# Patient Record
Sex: MALE | Race: WHITE | Employment: FULL TIME
[De-identification: names, ages, dates, MRNs, and addresses within clinical notes are randomized per-mention and may not be internally consistent; named-entity substitution may affect disease eponyms.]

---

## 2017-02-13 DIAGNOSIS — F33.41 RECURRENT MAJOR DEPRESSIVE DISORDER, IN PARTIAL REMISSION (H): ICD-10-CM

## 2017-02-13 RX ORDER — BUPROPION HYDROCHLORIDE 100 MG/1
100 TABLET, EXTENDED RELEASE ORAL 2 TIMES DAILY
Qty: 180 TABLET | Refills: 2 | Status: SHIPPED | OUTPATIENT
Start: 2017-02-13 | End: 2017-11-12

## 2017-03-28 DIAGNOSIS — F41.8 DEPRESSION WITH ANXIETY: ICD-10-CM

## 2017-06-17 ENCOUNTER — HEALTH MAINTENANCE LETTER (OUTPATIENT)
Age: 62
End: 2017-06-17

## 2017-07-12 ENCOUNTER — OFFICE VISIT (OUTPATIENT)
Dept: FAMILY MEDICINE | Facility: CLINIC | Age: 62
End: 2017-07-12

## 2017-07-12 VITALS
HEART RATE: 67 BPM | SYSTOLIC BLOOD PRESSURE: 132 MMHG | DIASTOLIC BLOOD PRESSURE: 82 MMHG | HEIGHT: 72 IN | OXYGEN SATURATION: 97 % | BODY MASS INDEX: 35.35 KG/M2 | WEIGHT: 261 LBS

## 2017-07-12 DIAGNOSIS — F41.1 GAD (GENERALIZED ANXIETY DISORDER): ICD-10-CM

## 2017-07-12 DIAGNOSIS — R73.02 GLUCOSE INTOLERANCE (IMPAIRED GLUCOSE TOLERANCE): ICD-10-CM

## 2017-07-12 DIAGNOSIS — Z12.11 SPECIAL SCREENING FOR MALIGNANT NEOPLASMS, COLON: ICD-10-CM

## 2017-07-12 DIAGNOSIS — F33.41 RECURRENT MAJOR DEPRESSIVE DISORDER, IN PARTIAL REMISSION (H): ICD-10-CM

## 2017-07-12 DIAGNOSIS — N40.1 BENIGN PROSTATIC HYPERPLASIA WITH URINARY FREQUENCY: ICD-10-CM

## 2017-07-12 DIAGNOSIS — E66.09 NON MORBID OBESITY DUE TO EXCESS CALORIES: ICD-10-CM

## 2017-07-12 DIAGNOSIS — R35.0 BENIGN PROSTATIC HYPERPLASIA WITH URINARY FREQUENCY: ICD-10-CM

## 2017-07-12 DIAGNOSIS — Z00.00 ROUTINE GENERAL MEDICAL EXAMINATION AT A HEALTH CARE FACILITY: Primary | ICD-10-CM

## 2017-07-12 DIAGNOSIS — M54.12 CERVICAL RADICULOPATHY: ICD-10-CM

## 2017-07-12 DIAGNOSIS — M25.552 HIP PAIN, LEFT: ICD-10-CM

## 2017-07-12 DIAGNOSIS — E55.9 VITAMIN D DEFICIENCY: ICD-10-CM

## 2017-07-12 LAB — HEMOCCULT STL QL: NORMAL

## 2017-07-12 PROCEDURE — 99396 PREV VISIT EST AGE 40-64: CPT | Performed by: FAMILY MEDICINE

## 2017-07-12 PROCEDURE — 82274 ASSAY TEST FOR BLOOD FECAL: CPT | Performed by: FAMILY MEDICINE

## 2017-07-12 PROCEDURE — 36415 COLL VENOUS BLD VENIPUNCTURE: CPT | Performed by: FAMILY MEDICINE

## 2017-07-12 PROCEDURE — 99213 OFFICE O/P EST LOW 20 MIN: CPT | Mod: 25 | Performed by: FAMILY MEDICINE

## 2017-07-12 RX ORDER — DOXAZOSIN 4 MG/1
6 TABLET ORAL AT BEDTIME
Qty: 135 TABLET | Refills: 3 | Status: SHIPPED | OUTPATIENT
Start: 2017-07-12 | End: 2018-05-10

## 2017-07-12 RX ORDER — FINASTERIDE 5 MG/1
5 TABLET, FILM COATED ORAL DAILY
Qty: 30 TABLET | Refills: 3 | Status: SHIPPED | OUTPATIENT
Start: 2017-07-12 | End: 2017-11-02

## 2017-07-12 NOTE — LETTER
My Depression Action Plan  Name: Wesley Art   Date of Birth 1955  Date: 7/12/2017    My doctor: Ailyn Gutierrez   My clinic: [facility] [address] [place] [address] [phone]          GREEN    ZONE   Good Control    What it looks like:     Things are going generally well. You have normal up s and down s. You may even feel depressed from time to time, but bad moods usually last less than a day.   What you need to do:  1. Continue to care for yourself (see self care plan)  2. Check your depression survival kit and update it as needed  3. Follow your physician s recommendations including any medication.  4. Do not stop taking medication unless you consult with your physician first.           YELLOW         ZONE Getting Worse    What it looks like:     Depression is starting to interfere with your life.     It may be hard to get out of bed; you may be starting to isolate yourself from others.    Symptoms of depression are starting to last most all day and this has happened for several days.     You may have suicidal thoughts but they are not constant.   What you need to do:     1. Call your care team, your response to treatment will improve if you keep your care team informed of your progress. Yellow periods are signs an adjustment may need to be made.     2. Continue your self-care, even if you have to fake it!    3. Talk to someone in your support network    4. Open up your depression survival kit           RED    ZONE Medical Alert - Get Help    What it looks like:     Depression is seriously interfering with your life.     You may experience these or other symptoms: You can t get out of bed most days, can t work or engage in other necessary activities, you have trouble taking care of basic hygiene, or basic responsibilities, thoughts of suicide or death that will not go away, self-injurious behavior.     What you need to do:  1. Call your care team  and request a same-day appointment. If they are not available (weekends or after hours) call your local crisis line, emergency room or 911.      Electronically signed by: Brittny Zavala, July 12, 2017    Depression Self Care Plan / Survival Kit    Self-Care for Depression  Here s the deal. Your body and mind are really not as separate as most people think.  What you do and think affects how you feel and how you feel influences what you do and think. This means if you do things that people who feel good do, it will help you feel better.  Sometimes this is all it takes.  There is also a place for medication and therapy depending on how severe your depression is, so be sure to consult with your medical provider and/ or Behavioral Health Consultant if your symptoms are worsening or not improving.     In order to better manage my stress, I will:    Exercise  Get some form of exercise, every day. This will help reduce pain and release endorphins, the  feel good  chemicals in your brain. This is almost as good as taking antidepressants!  This is not the same as joining a gym and then never going! (they count on that by the way ) It can be as simple as just going for a walk or doing some gardening, anything that will get you moving.      Hygiene   Maintain good hygiene (Get out of bed in the morning, Make your bed, Brush your teeth, Take a shower, and Get dressed like you were going to work, even if you are unemployed).  If your clothes don't fit try to get ones that do.    Diet  I will strive to eat foods that are good for me, drink plenty of water, and avoid excessive sugar, caffeine, alcohol, and other mood-altering substances.  Some foods that are helpful in depression are: complex carbohydrates, B vitamins, flaxseed, fish or fish oil, fresh fruits and vegetables.    Psychotherapy  I agree to participate in Individual Therapy (if recommended).    Medication  If prescribed medications, I agree to take them.  Missing  doses can result in serious side effects.  I understand that drinking alcohol, or other illicit drug use, may cause potential side effects.  I will not stop my medication abruptly without first discussing it with my provider.    Staying Connected With Others  I will stay in touch with my friends, family members, and my primary care provider/team.    Use your imagination  Be creative.  We all have a creative side; it doesn t matter if it s oil painting, sand castles, or mud pies! This will also kick up the endorphins.    Witness Beauty  (AKA stop and smell the roses) Take a look outside, even in mid-winter. Notice colors, textures. Watch the squirrels and birds.     Service to others  Be of service to others.  There is always someone else in need.  By helping others we can  get out of ourselves  and remember the really important things.  This also provides opportunities for practicing all the other parts of the program.    Humor  Laugh and be silly!  Adjust your TV habits for less news and crime-drama and more comedy.    Control your stress  Try breathing deep, massage therapy, biofeedback, and meditation. Find time to relax each day.     My support system    Clinic Contact:  Phone number:    Contact 1:  Phone number:    Contact 2:  Phone number:    [employer]/:  Phone number:    Therapist:  Phone number:    Local crisis center:    Phone number:    Other community support:  Phone number:

## 2017-07-12 NOTE — MR AVS SNAPSHOT
After Visit Summary   7/12/2017    Wesley Art    MRN: [id number]           Patient Information     Date Of Birth          1955        Visit Information        Provider Department      7/12/2017 8:45 AM Ailyn Gutierrez MD [facility]        Today's Diagnoses     Routine general medical examination at a health care facility    -  1    Glucose intolerance (impaired glucose tolerance)        Recurrent major depressive disorder, in partial remission (H)        LU (generalized anxiety disorder)        Hip pain, left        Cervical radiculopathy        Vitamin D deficiency        Benign prostatic hyperplasia with urinary frequency        Non morbid obesity due to excess calories        Special screening for malignant neoplasms, colon          Care Instructions      Preventive Health Recommendations  Male Ages 50 - 64    Yearly exam:             See your health care provider every year in order to  o   Review health changes.   o   Discuss preventive care.    o   Review your medicines if your doctor has prescribed any.     Have a cholesterol test every 5 years, or more frequently if you are at risk for high cholesterol/heart disease.     Have a diabetes test (fasting glucose) every three years. If you are at risk for diabetes, you should have this test more often.     Have a colonoscopy at age 50, or have a yearly FIT test (stool test). These exams will check for colon cancer.      Talk with your health care provider about whether or not a prostate cancer screening test (PSA) is right for you.    You should be tested each year for STDs (sexually transmitted diseases), if you re at risk.     Shots: Get a flu shot each year. Get a tetanus shot every 10 years.     Nutrition:    Eat at least 5 servings of fruits and vegetables daily.     Eat whole-grain bread, whole-wheat pasta and brown rice instead of white grains and rice.     Talk to your provider about Calcium and Vitamin D.  "    Lifestyle    Exercise for at least 150 minutes a week (30 minutes a day, 5 days a week). This will help you control your weight and prevent disease.     Limit alcohol to one drink per day.     No smoking.     Wear sunscreen to prevent skin cancer.     See your dentist every six months for an exam and cleaning.     See your eye doctor every 1 to 2 years.            Follow-ups after your visit        Who to contact     If you have questions or need follow up information about today's clinic visit or your schedule please contact [facility] directly at [phone].  Normal or non-critical lab and imaging results will be communicated to you by [employer]hart, letter or phone within 4 business days after the clinic has received the results. If you do not hear from us within 7 days, please contact the clinic through [employer] or phone. If you have a critical or abnormal lab result, we will notify you by phone as soon as possible.  Submit refill requests through [employer] or call your pharmacy and they will forward the refill request to us. Please allow 3 business days for your refill to be completed.          Additional Information About Your Visit        [employer] Information     [employer]s lets you send messages to your doctor, view your test results, renew your prescriptions, schedule appointments and more. To sign up, go to [email or web address] . Click on \"Log in\" on the left side of the screen, which will take you to the Welcome page. Then click on \"Sign up Now\" on the right side of the page.     You will be asked to enter the access code listed below, as well as some personal information. Please follow the directions to create your username and password.     Your access code is: [email or web address]  Expires: 10/10/2017 10:17 AM     Your access code will  in 90 days. If you need help or a new code, please call your [place] clinic or [phone].        Care EveryWhere ID     This is your Care EveryWhere ID. This " "could be used by other organizations to access your [facility] medical records  [id number]        Your Vitals Were     Pulse Height Pulse Oximetry BMI (Body Mass Index)          67 1.822 m (5' 11.75\") 97% 35.65 kg/m2         Blood Pressure from Last 3 Encounters:   07/12/17 132/82   11/11/16 (!) 146/97   11/01/16 134/80    Weight from Last 3 Encounters:   07/12/17 118.4 kg (261 lb)   11/01/16 108.9 kg (240 lb)   10/31/16 114.3 kg (252 lb)              We Performed the Following     Comp. Metabolic Panel (14) ([facility])     DEPRESSION ACTION PLAN (DAP)     Hemoglobin A1C ([facility])     Hemosure - IFOB ([facility])     Lipid Panel ([facility])     PSA Serum ([facility])     VENOUS COLLECTION     Vitamin D  25-Hydroxy ([facility])          Today's Medication Changes          These changes are accurate as of: 7/12/17 10:17 AM.  If you have any questions, ask your nurse or doctor.               These medicines have changed or have updated prescriptions.        Dose/Directions    doxazosin 4 MG tablet   Commonly known as:  CARDURA   This may have changed:  See the new instructions.   Used for:  Benign prostatic hyperplasia with urinary frequency   Changed by:  Ailyn Gutierrez MD        Dose:  6 mg   Take 1.5 tablets (6 mg) by mouth At Bedtime   Quantity:  135 tablet   Refills:  3       sertraline 50 MG tablet   Commonly known as:  ZOLOFT   This may have changed:  how much to take   Used for:  Recurrent major depressive disorder, in partial remission (H), LU (generalized anxiety disorder)   Changed by:  Ailyn Gutierrez MD        Dose:  75 mg   Take 1.5 tablets (75 mg) by mouth daily   Quantity:  135 tablet   Refills:  3            Where to get your medicines      These medications were sent to [facility] [address] [facility], [place] - [address] AT [facility] [address]  [address], [facility] [address]     Phone:  [phone]     doxazosin 4 MG tablet    finasteride 5 MG tablet    " sertraline 50 MG tablet                Primary Care Provider Office Phone # Fax #    Ailyn Geraldine Gutierrez [phone]       [facility] [address]  [facility] [address]        Equal Access to Services     MONETCHRISTA ALFONSO : Hadii daysi ku hadaydeo Soomaali, waaxda luqadaha, qaybta kaalmada adeegyada, tye womackn matilde tian larosimalik eric. So [facility] [phone].    ATENCIÓN: Si habla español, tiene a salinas disposición servicios gratuitos de asistencia lingüística. Llame al [phone].    We comply with applicable federal civil rights laws and [place] laws. We do not discriminate on the basis of race, color, national origin, age, disability sex, sexual orientation or gender identity.            Thank you!     Thank you for choosing [facility]  for your care. Our goal is always to provide you with excellent care. Hearing back from our patients is one way we can continue to improve our services. Please take a few minutes to complete the written survey that you may receive in the mail after your visit with us. Thank you!             Your Updated Medication List - Protect others around you: Learn how to safely use, store and throw away your medicines at www.disposemymeds.org.          This list is accurate as of: 7/12/17 10:17 AM.  Always use your most recent med list.                   Brand Name Dispense Instructions for use Diagnosis    B-12 (Methylcobalamin) 1000 MCG Subl     90 tablet    Place 1 tablet under the tongue every other day    H/O gastric bypass, B12 deficiency       buPROPion 100 MG 12 hr tablet    WELLBUTRIN SR    180 tablet    Take 1 tablet (100 mg) by mouth 2 times daily    Recurrent major depressive disorder, in partial remission (H)       doxazosin 4 MG tablet    CARDURA    135 tablet    Take 1.5 tablets (6 mg) by mouth At Bedtime    Benign prostatic hyperplasia with urinary frequency       finasteride 5 MG tablet    PROSCAR    30 tablet    Take 1 tablet  (5 mg) by mouth daily    Benign prostatic hyperplasia with urinary frequency       MULTI COMPLETE PO      Take  by mouth.        omeprazole 20 MG CR capsule    priLOSEC    90 capsule    Take 1 capsule by mouth daily.    Esophageal reflux       sertraline 50 MG tablet    ZOLOFT    135 tablet    Take 1.5 tablets (75 mg) by mouth daily    Recurrent major depressive disorder, in partial remission (H), LU (generalized anxiety disorder)       VITAMIN C PO      Take 1,000 mg by mouth daily        VITAMIN D (CHOLECALCIFEROL) PO      Take 2,000 Units by mouth daily    Needs flu shot

## 2017-07-12 NOTE — PROGRESS NOTES
"  SUBJECTIVE:   CC: Wesley Art is an 62 year old male who presents for preventative health visit.     Healthy Habits:    Do you get at least three servings of calcium containing foods daily (dairy, green leafy vegetables, etc.)? yes and no, taking calcium and/or vitamin D supplement    Amount of exercise or daily activities, outside of work: 1 day(s) per week, walk, mow lawn    Problems taking medications regularly No    Medication side effects: No    Have you had an eye exam in the past two years? yes    Do you see a dentist twice per year? no    Do you have sleep apnea, excessive snoring or daytime drowsiness?no        Depression and Anxiety Follow-Up    Status since last visit: Improved since last dose adjustment of medications including adding sertraline and lowering Wellbutrin    Other associated symptoms:stress overeating, but has stayed away from alcohol which previously was his crutch. He has been sober since 11/11/07.    Complicating factors:     Significant life event: Yes-  Death of mother, suicide death of friend     Current substance abuse: None    BPH  Better since being on Cardura, but not perfect.     Left Hip Injury  Four days ago was playing frisbee and he noticed a \"pop\" in his left hip as he stretched on a frisbee throw follow through. He was able to ambulate with pain. He iced the area. It has gradually improved. It is now quite bruised.    Cervical Radiculopathy  He continues to have pain in the right elbow and hand. He has started going to an acupuncturist and using an ergonamic mouse which has helped. There is a spot on his right hand that when touched sends electric shock type symptom into the the index finger.         PHQ-9 SCORE 6/12/2015 7/7/2016 8/18/2016   Total Score 4 - -   Total Score - 7 9     No flowsheet data found.    PHQ-9  English  PHQ-9   Any Language  GAD7      HEARING FREQUENCY:   Right Ear: passed  Left Ear: passed    Today's PHQ-2 Score:   PHQ-2 ( 1999 Pf[employer]) " 7/12/2017 7/7/2016   Q1: Little interest or pleasure in doing things 1 2   Q2: Feeling down, depressed or hopeless 1 2   PHQ-2 Score 2 4       Abuse: Current or Past(Physical, Sexual or Emotional)- No  Do you feel safe in your environment - Yes    Social History   Substance Use Topics     Smoking status: Never Smoker     Smokeless tobacco: Never Used     Alcohol use No      Comment: sober since 11/11/2007     none    Last PSA: No results found for: PSA    Reviewed orders with patient. Reviewed health maintenance and updated orders accordingly - Yes  Labs reviewed in EPIC  BP Readings from Last 3 Encounters:   07/12/17 132/82   11/11/16 (!) 146/97   11/01/16 134/80    Wt Readings from Last 3 Encounters:   07/12/17 118.4 kg (261 lb)   11/01/16 108.9 kg (240 lb)   10/31/16 114.3 kg (252 lb)                  Patient Active Problem List   Diagnosis     Major depression in partial remission (H)     Primary osteoarthritis involving multiple joints     Benign nodular prostatic hyperplasia with lower urinary tract symptoms     Glucose intolerance (impaired glucose tolerance)     LU (generalized anxiety disorder)     Cervical radiculopathy     Past Surgical History:   Procedure Laterality Date     APPENDECTOMY  child     CHOLECYSTECTOMY, LAPOROSCOPIC  1998    Cholecystectomy, Laparoscopic     LAPAROSCOPIC REVISION GASTROPLASTY TO MACIEJ-EN-Y  2006     VASECTOMY  1980       Social History   Substance Use Topics     Smoking status: Never Smoker     Smokeless tobacco: Never Used     Alcohol use No      Comment: sober since 11/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 Father          Current Outpatient Prescriptions   Medication Sig Dispense Refill     sertraline (ZOLOFT) 50 MG tablet Take 1.5 tablets (75 mg) by mouth daily 135 tablet 3     buPROPion (WELLBUTRIN SR) 100 MG 12 hr tablet Take 1 tablet (100 mg) by mouth 2 times daily 180 tablet 2     VITAMIN D, CHOLECALCIFEROL, PO Take 2,000 Units by mouth  daily       doxazosin (CARDURA) 4 MG tablet TAKE 1 TABLET BY MOUTH EVERY NIGHT AT BEDTIME 90 tablet 3     B-12, Methylcobalamin, 1000 MCG SUBL Place 1 tablet under the tongue every other day 90 tablet 1     finasteride (PROSCAR) 5 MG tablet Take 5 mg by mouth daily  3     Multiple Vitamins-Minerals (MULTI COMPLETE PO) Take  by mouth.       Ascorbic Acid (VITAMIN C PO) Take 1,000 mg by mouth daily        omeprazole (PRILOSEC) 20 MG capsule Take 1 capsule by mouth daily. 90 capsule 3     [DISCONTINUED] sertraline (ZOLOFT) 50 MG tablet Take 1 tablet (50 mg) by mouth daily 30 tablet 3     Allergies   Allergen Reactions     Iodine      Recent Labs   Lab Test  08/01/16   0953  07/07/16   0851  06/12/15   0856  04/07/14   0928  03/04/14   1109  03/04/14   0900   A1C   --   5.6  5.7*   --   5.5   --    LDL   --    --   93   --    --   97   HDL   --    --   75   --    --   85   TRIG   --    --   86   --    --   108   ALT   --    --   24   --    --   23   CR  1.00   --   1.01   --    --   1.05   POTASSIUM   --    --   4.3  4.1   --   3.8        Reviewed and updated as needed this visit by clinical staff  Tobacco  Allergies  Meds         Reviewed and updated as needed this visit by Provider        Past Medical History:   Diagnosis Date     Alcohol abuse, unspecified     PMH alcohol abuse     Depressive disorder, not elsewhere classified      Essential hypertension, benign     resolved     Hematuria      Obesity, unspecified      Osteoarthritis 6/12/2015     Type II or unspecified type diabetes mellitus without mention of complication, not stated as uncontrolled     resolved      Past Surgical History:   Procedure Laterality Date     APPENDECTOMY  child     CHOLECYSTECTOMY, LAPOROSCOPIC  1998    Cholecystectomy, Laparoscopic     LAPAROSCOPIC REVISION GASTROPLASTY TO MACIEJ-EN-Y  2006     VASECTOMY  1980       ROS:  C: NEGATIVE for fever, chills, change in weight  I: NEGATIVE for worrisome rashes, moles or lesions  E: NEGATIVE  "for vision changes or irritation  ENT: NEGATIVE for ear, mouth and throat problems  R: NEGATIVE for significant cough or SOB  CV: NEGATIVE for chest pain, palpitations or peripheral edema  GI: NEGATIVE for nausea, abdominal pain, heartburn, or change in bowel habits   male: negative for dysuria, hematuria, and see above  MUSCULOSKELETAL:as above  N: NEGATIVE for weakness, dizziness or paresthesias  P: NEGATIVE for changes in mood or affect    OBJECTIVE:   /82  Pulse 67  Ht 1.822 m (5' 11.75\")  Wt 118.4 kg (261 lb)  SpO2 97%  BMI 35.65 kg/m2  EXAM:  GENERAL: healthy, alert and no distress  EYES: Eyes grossly normal to inspection, PERRL and conjunctivae and sclerae normal  HENT: ear canals and TM's normal, nose and mouth without ulcers or lesions  NECK: no adenopathy, no asymmetry, masses, or scars and thyroid normal to palpation  RESP: lungs clear to auscultation - no rales, rhonchi or wheezes  CV: regular rate and rhythm, normal S1 S2, no S3 or S4, no murmur, click or rub, no peripheral edema and peripheral pulses strong  ABDOMEN: soft, nontender, no hepatosplenomegaly, no masses and bowel sounds normal   (male): normal male genitalia without lesions or urethral discharge, no hernia  RECTAL: normal sphincter tone, no rectal masses, prostate normal size, smooth, nontender without nodules or masses  MS: left hip with lateral tenderness with internal rotation, fine with external rotation, no pain with abduction, adduction, No pain to palpation of the iliac crest, bruising is noted in the area between the iliac crest and the trochanter.  SKIN: no suspicious lesions or rashes  NEURO: Normal strength and tone, mentation intact and speech normal  PSYCH: mentation appears normal, affect normal/bright    ASSESSMENT/PLAN:   Wesley was seen today for physical and hearing screening.    Diagnoses and all orders for this visit:    Routine general medical examination at a health care facility  -     Lipid Panel " "([facility])  -     VENOUS COLLECTION    Glucose intolerance (impaired glucose tolerance)  -     Hemoglobin A1C ([facility])  -     VENOUS COLLECTION    Recurrent major depressive disorder, in partial remission (H)  -     sertraline (ZOLOFT) 50 MG tablet; Take 1.5 tablets (75 mg) by mouth daily  Continue Wellbutrin.    LU (generalized anxiety disorder)  -     sertraline (ZOLOFT) 50 MG tablet; Take 1.5 tablets (75 mg) by mouth daily  Trial of increasing dose slightly.     Hip pain, left  Continue with ice, ibuprofen.    Cervical radiculopathy  Refer to OT/PT for evaluation and treatment.    Vitamin D deficiency  -     Vitamin D  25-Hydroxy ([facility])    Benign prostatic hyperplasia with urinary frequency  -     PSA Serum ([facility])  Continue finesteride 5 mg daily, trial of increasing doxazosyn to 6 mg at hs.    Non morbid obesity due to excess calories  -     Comp. Metabolic Panel (14) ([facility])  -     VENOUS COLLECTION  Strongly recommend adding regular exercise.    Special screening for malignant neoplasms, colon  -     Hemosure - IFOB ([facility])  He is also given a hemosure for collection at home.     Other orders  -     DEPRESSION ACTION PLAN (DAP)        COUNSELING:  Reviewed preventive health counseling, as reflected in patient instructions       Regular exercise       Healthy diet/nutrition       Vision screening       Hearing screening       Colon cancer screening       Prostate cancer screening         reports that he has never smoked. He has never used smokeless tobacco.    Estimated body mass index is 35.65 kg/(m^2) as calculated from the following:    Height as of this encounter: 1.822 m (5' 11.75\").    Weight as of this encounter: 118.4 kg (261 lb).   Weight management plan: Discussed healthy diet and exercise guidelines and patient will follow up in 12 months in clinic to re-evaluate.    Counseling Resources:  ATP IV Guidelines  Pooled Cohorts Equation Calculator  FRAX Risk Assessment  ICSI Preventive " Guidelines  Dietary Guidelines for Americans, 2010  USDA's MyPlate  ASA Prophylaxis  Lung CA Screening    Ailyn Gutierrez MD  [facility]

## 2017-07-13 LAB
ALBUMIN SERPL-MCNC: 4.1 G/DL (ref 3.6–4.8)
ALBUMIN/GLOB SERPL: 1.4 {RATIO} (ref 1.2–2.2)
ALP SERPL-CCNC: 73 IU/L (ref 39–117)
ALT SERPL-CCNC: 23 IU/L (ref 0–44)
AST SERPL-CCNC: 27 IU/L (ref 0–40)
BILIRUB SERPL-MCNC: 0.5 MG/DL (ref 0–1.2)
BUN SERPL-MCNC: 15 MG/DL (ref 8–27)
BUN/CREATININE RATIO: 16 (ref 10–24)
CALCIUM SERPL-MCNC: 9.5 MG/DL (ref 8.6–10.2)
CHLORIDE SERPLBLD-SCNC: 104 MMOL/L (ref 96–106)
CHOLEST SERPL-MCNC: 206 MG/DL (ref 100–199)
CREAT SERPL-MCNC: 0.92 MG/DL (ref 0.76–1.27)
EGFR IF AFRICN AM: 101 ML/MIN/1.73
EGFR IF NONAFRICN AM: 88 ML/MIN/1.73
GLOBULIN, TOTAL: 2.9 G/DL (ref 1.5–4.5)
GLUCOSE SERPL-MCNC: 100 MG/DL (ref 65–99)
HBA1C MFR BLD: 5.6 % (ref 4.8–5.6)
HDLC SERPL-MCNC: 65 MG/DL
LDL/HDL RATIO: 1.8 RATIO UNITS (ref 0–3.6)
LDLC SERPL CALC-MCNC: 116 MG/DL (ref 0–99)
POTASSIUM SERPL-SCNC: 4.6 MMOL/L (ref 3.5–5.2)
PROT SERPL-MCNC: 7 G/DL (ref 6–8.5)
PSA NG/ML: 0.5 NG/ML (ref 0–4)
SODIUM SERPL-SCNC: 143 MMOL/L (ref 134–144)
TOTAL CO2: 23 MMOL/L (ref 18–28)
TRIGL SERPL-MCNC: 126 MG/DL (ref 0–149)
VITAMIN D, 25-HYDROXY: 26 NG/ML (ref 30–100)
VLDLC SERPL CALC-MCNC: 25 MG/DL (ref 5–40)

## 2017-07-13 ASSESSMENT — PATIENT HEALTH QUESTIONNAIRE - PHQ9: SUM OF ALL RESPONSES TO PHQ QUESTIONS 1-9: 6

## 2017-07-18 ENCOUNTER — TELEPHONE (OUTPATIENT)
Dept: FAMILY MEDICINE | Facility: CLINIC | Age: 62
End: 2017-07-18

## 2017-07-18 DIAGNOSIS — R79.89 LOW VITAMIN D LEVEL: Primary | ICD-10-CM

## 2017-07-18 RX ORDER — CHOLECALCIFEROL (VITAMIN D3) 125 MCG
5000 CAPSULE ORAL DAILY
Qty: 100 TABLET | Refills: 3 | COMMUNITY
Start: 2017-07-18

## 2017-07-18 NOTE — TELEPHONE ENCOUNTER
Called patient with lab results.  Informed him of all labs looking OK except for low Vit D level.  Patient does say he takes 2000 iu every day.  Advised him to increase this by 2000 iu so he will take 4000 iu daily and take with a meal.  Mailed out patient copy of labs.

## 2017-07-27 ENCOUNTER — THERAPY VISIT (OUTPATIENT)
Dept: OCCUPATIONAL THERAPY | Facility: CLINIC | Age: 62
End: 2017-07-27
Payer: COMMERCIAL

## 2017-07-27 DIAGNOSIS — M54.12 CERVICAL RADICULOPATHY: ICD-10-CM

## 2017-07-27 DIAGNOSIS — M25.529 ELBOW PAIN: Primary | ICD-10-CM

## 2017-07-27 PROCEDURE — 97165 OT EVAL LOW COMPLEX 30 MIN: CPT | Mod: GO | Performed by: OCCUPATIONAL THERAPIST

## 2017-07-27 PROCEDURE — 97110 THERAPEUTIC EXERCISES: CPT | Mod: GO | Performed by: OCCUPATIONAL THERAPIST

## 2017-07-27 PROCEDURE — 97140 MANUAL THERAPY 1/> REGIONS: CPT | Mod: GO | Performed by: OCCUPATIONAL THERAPIST

## 2017-07-27 NOTE — PROGRESS NOTES
Hand Therapy Initial Evaluation    Current Date:  7/27/2017    Diagnosis:  Cervical radiculopathy and elbow pain  DOI:  3 months    Subjective:  Wesley Art is a 62 year old right hand dominant male.    Patient reports symptoms of pain, stiffness/loss of motion and weakness/loss of strength of the right hand which occurred due to overuse. Since onset symptoms are Gradually getting worse.  Special tests:  none.  Previous treatment: PT one year ago.    General health as reported by patient is good.  Pertinent medical history includes:diabetes, overweight, high blood pressure, depression , migraines/headaches, numbness/tingling, dizziness/concussions  Medical allergies:none.  Surgical history: other: gastric bypass.  Medication history: anti-depressants.    Occupational Profile Information:  Current occupation is   Currently working in normal job without restrictions  Job Tasks: prolonged sitting, repetitive tasks  Prior functional level:  no limitations  Barriers include:none  Mobility: No difficulty  Transportation: drives  Leisure activities/hobbies: frisbee, yard work, golfing, cooking  Other: na    Upper Extremity Functional Index Score:  SCORE:   Column Totals: /80: 53   (A lower score indicates greater disability.)    Objective:    Pain Level Report  VAS(0-10) 7/27/2017   At Rest: Elbow: 2-3 with pressure  Hand: 2 stiff   With Use: Elbow: 6-7  Hand 7-8     Report of Pain:  Location:  elbow and hand  Pain Quality:  Aching and Dull  Frequency: constant    Pain is worst:  daytime  Exacerbated by:  use  Relieved by:  cold  Progression:  worsening  ROM:  Pain Report:  - none    + mild    ++ moderate    +++ severe   Elbow  7/27/2017   AROM(PROM) Left Right   Extension  Full   Flexion  Full   Supination  Stretch pain   Pronation  Full     ROM  Wrist 7/27/17   AROM (PROM) Right   Extension Full   Flexion Full   RD Full   UD Full     Palaption: pain scale of 0-10/10  Date 7/27/2017   Side Right   MEP  "+   Flexor Wad Origin +   Cubital Tunnel (just proximal) +   PT +   Wrist  + radial side     Other Provocative Testing 7/27/2017       Resisted APL/EPB -       Finkelsteins  \"pull\"                                   Palpation Ulnar Nerve Path: Pain level report on scale 0-10/10  Date 7/27/2017   Side Right   [place] -   Flexor Wad Origin +   Cubital Tunnel -   [place] -   Subscapularis +     Range of Motion Ring Finger AROM (PROM):  Date 7/27/2017   Side Right   MP ext Full   PIP ext Full   DIP ext Full     Range of Motion Small Finger AROM (PROM):  Date 7/27/2017   Side Right   MP ext Full   PIP ext Full   DIP ext Full     Special Tests:  Pain Report:  - none    + mild    ++ moderate    +++ severe   Date 7/27/2017   Side Right   Elbow Flexion Test -   Tinels at [place] -   Tinels at Cubital Tunnel -   Ulnar Nerve ULTT 50 %   Paresthesias At HS   Wartenburg's Sign -     MMT: MMT scale of 0-5/5, pain scale of 0-10/10  Date 7/27/2017   Side Right   Elbow flex in neutral 5/5   Elbow flex in pronation 5/5   Wrist flexion 5/5   Pronation 5/5     Ulnar Nerve MMT: (Scale 0/5)  Date 7/27/2017   Side Right   FCU 5/5-   FDP 3 & 4 5/5   ADM 5/5   ODM 5/5   FDM 5/5   Palmar Interossei 4-/5   Dorsal Interossei 4-/5   Lumbricals 3&4 5/5   Adductor Pollicis 5/5   FPB 5/5     Strength:   (Measured in pounds)  Pain Report:  - none    + mild    ++ moderate    +++ severe     7/27/2017   Trials Left Right   1  2  3     Average: 70# 78#     Lat Pinch  7/27/2017   Trials Left Right   1  2  3     Average: 20# 20#     3 Pt Pinch  7/27/2017   Trials Left Right   1  2  3     Average: 19# 19#     Assessment:  Patient presents with symptoms consistent with diagnosis of right Medial Epicondylitis, pain in index DIP joint, tightness in UE (especially pecs) with conservative intervention. Slight Dequervains symptoms.    Patient's limitations or Problem List includes:  Pain, Weakness, Decreased , Decreased " coordination, Decreased dexterity and Tightness in musculature of the right elbow which interferes with the patient's ability to perform Self Care Tasks (dressing, eating, bathing, hygiene/toileting), Recreational Activities and Household Chores as compared to previous level of function.    Rehab Potential:  Excellent - Return to full activity, no limitations    Patient will benefit from skilled Occupational Therapy to increase flexibility, overall strength,  strength, forearm strength, coordination and dexterity and decrease pain to return to previous activity level and resume normal daily tasks and to reach their rehab potential.    Barriers to Learning:  No barrier    Communication Issues:  Patient appears to be able to clearly communicate and understand verbal and written communication and follow directions correctly.    Chart Review: Simple history review with patient    Identified Performance Deficits: bathing/showering, dressing, feeding, hygiene and grooming, driving and community mobility, health management and maintenance, home establishment and management, meal preparation and cleanup, shopping, work and leisure activities    Assessment of Occupational Performance:  5 or more Performance Deficits    Clinical Decision Making (Complexity): Low complexity    Treatment Explanation:  The following has been discussed with the patient:  RX ordered/plan of care  Anticipated outcomes  Possible risks and side effects    Plan:  Frequency:  1 X week, once daily  Duration:  for 8-12 visits    Treatment Plan:    Modalities:  US  Therapeutic Exercise:  AROM, PROM, Place and Hold, Contract Relax, Isotonics, Isometrics and Stabilization  Neuromuscular re-education:  Nerve Gliding, Coordination/Dexterity and Kinesthetic Training  Manual Techniques:  Joint mobilization and Myofascial release  Orthotic Fabrication:  Static orthosis and Forearm based orthosis  Discharge Plan:  Achieve all LTG.  Independent in home  treatment program.  Reach maximal therapeutic benefit.    Home Exercise Program:  Flexor and extensor wad stretches  MFR to flexors  Pec stretch on wall while trigger pointing tight pec major with tennis ball  Pec minor trigger massage over a towel on table with a tennis ball  Golf ball to flexors and volar hand  FA flexor stretches  FM MEP prn  Icing  Scapular squeezes    Next Visit:  Infared laser  MFR  Nerve gliding  Review back strengthening

## 2017-07-27 NOTE — MR AVS SNAPSHOT
"              After Visit Summary   7/27/2017    Wesley Art    MRN: [id number]           Patient Information     Date Of Birth          1955        Visit Information        Provider Department      7/27/2017 8:00 AM Devika Pereira [facility]        Today's Diagnoses     Elbow pain    -  1    Cervical radiculopathy           Follow-ups after your visit        Your next 10 appointments already scheduled     Aug 03, 2017  7:30 AM CDT   VENESSA Hand with Devika Montanez   [facility]    [address] [id number] [phone]            Aug 15, 2017 12:30 PM CDT   VENESSA Hand with Devika Pereira   [facility]    [address] [id number] [phone]              Who to contact     If you have questions or need follow up information about today's clinic visit or your schedule please contact [facility] directly at [phone].  Normal or non-critical lab and imaging results will be communicated to you by [employer], letter or phone within 4 business days after the clinic has received the results. If you do not hear from us within 7 days, please contact the clinic through [employer] or phone. If you have a critical or abnormal lab result, we will notify you by phone as soon as possible.  Submit refill requests through [employer] or call your pharmacy and they will forward the refill request to us. Please allow 3 business days for your refill to be completed.          Additional Information About Your Visit        [employer] Information     [employer] lets you send messages to your doctor, view your test results, renew your prescriptions, schedule appointments and more. To sign up, go to [email or web address] . Click on \"Log in\" on the left side of the screen, which will take you to the Welcome page. Then click on \"Sign up Now\" on the right side of the page.     You will be asked to enter the " access code listed below, as well as some personal information. Please follow the directions to create your username and password.     Your access code is: [email or web address]  Expires: 10/10/2017 10:17 AM     Your access code will  in 90 days. If you need help or a new code, please call your [place] clinic or [phone].        Care EveryWhere ID     This is your Care EveryWhere ID. This could be used by other organizations to access your [place] medical records  [id number]         Blood Pressure from Last 3 Encounters:   17 132/82   16 (!) 146/97   16 134/80    Weight from Last 3 Encounters:   17 118.4 kg (261 lb)   16 108.9 kg (240 lb)   10/31/16 114.3 kg (252 lb)              We Performed the Following     HC OT EVAL, LOW COMPLEXITY     VENESSA INITIAL EVAL REPORT     MANUAL THER TECH,1+REGIONS,EA 15 MIN     THERAPEUTIC EXERCISES        Primary Care Provider Office Phone # Fax #    Ailyn Geraldine Gutierrez [phone]       [facility] [address] [place] [address]        Equal Access to Services     JORGE HAMILTON : Hadii aad ku hadasho Soomaali, waaxda luqadaha, qaybta kaalmada adeegyada, tye adame haymattn matilde reyes. So [facility] [phone].    ATENCIÓN: Si habla español, tiene a salinas disposición servicios gratuitos de asistencia lingüística. Llame al [phone].    We comply with applicable federal civil rights laws and [place] laws. We do not discriminate on the basis of race, color, national origin, age, disability sex, sexual orientation or gender identity.            Thank you!     Thank you for choosing [facility]  for your care. Our goal is always to provide you with excellent care. Hearing back from our patients is one way we can continue to improve our services. Please take a few minutes to complete the written survey that you may receive in the mail after your visit with us. Thank you!             Your Updated  Medication List - Protect others around you: Learn how to safely use, store and throw away your medicines at www.disposemymeds.org.          This list is accurate as of: 7/27/17 11:57 AM.  Always use your most recent med list.                   Brand Name Dispense Instructions for use Diagnosis    B-12 (Methylcobalamin) 1000 MCG Subl     90 tablet    Place 1 tablet under the tongue every other day    H/O gastric bypass, B12 deficiency       buPROPion 100 MG 12 hr tablet    WELLBUTRIN SR    180 tablet    Take 1 tablet (100 mg) by mouth 2 times daily    Recurrent major depressive disorder, in partial remission (H)       doxazosin 4 MG tablet    CARDURA    135 tablet    Take 1.5 tablets (6 mg) by mouth At Bedtime    Benign prostatic hyperplasia with urinary frequency       finasteride 5 MG tablet    PROSCAR    30 tablet    Take 1 tablet (5 mg) by mouth daily    Benign prostatic hyperplasia with urinary frequency       MULTI COMPLETE PO      Take  by mouth.        omeprazole 20 MG CR capsule    priLOSEC    90 capsule    Take 1 capsule by mouth daily.    Esophageal reflux       sertraline 50 MG tablet    ZOLOFT    135 tablet    Take 1.5 tablets (75 mg) by mouth daily    Recurrent major depressive disorder, in partial remission (H), LU (generalized anxiety disorder)       VITAMIN C PO      Take 1,000 mg by mouth daily        vitamin D 2000 UNITS tablet     100 tablet    Take 2,000 Units by mouth 2 times daily    Low vitamin D level

## 2017-08-03 ENCOUNTER — THERAPY VISIT (OUTPATIENT)
Dept: OCCUPATIONAL THERAPY | Facility: CLINIC | Age: 62
End: 2017-08-03
Payer: COMMERCIAL

## 2017-08-03 DIAGNOSIS — M54.12 CERVICAL RADICULOPATHY: ICD-10-CM

## 2017-08-03 DIAGNOSIS — M25.529 ELBOW PAIN: ICD-10-CM

## 2017-08-03 PROCEDURE — 97110 THERAPEUTIC EXERCISES: CPT | Mod: GO | Performed by: OCCUPATIONAL THERAPIST

## 2017-08-03 PROCEDURE — 97026 INFRARED THERAPY: CPT | Mod: GO | Performed by: OCCUPATIONAL THERAPIST

## 2017-08-03 PROCEDURE — 97140 MANUAL THERAPY 1/> REGIONS: CPT | Mod: GO | Performed by: OCCUPATIONAL THERAPIST

## 2017-08-03 NOTE — PROGRESS NOTES
"SOAP note objective information for 8/3/2017.    Please refer to the daily flowsheet for treatment today, total treatment time and time spent performing 1:1 timed codes.       Pain Level Report  VAS(0-10) 7/27/2017 8/3/2017   At Rest: Elbow: 2-3 with pressure  Hand: 2 stiff Elbow: 1 with pressure  Hand: 2 stiff   With Use: Elbow: 6-7  Hand 7-8 Elbow: 4  Hand 6     Report of Pain:  Location:  elbow and hand  Pain Quality:  Aching and Dull  Frequency: constant    Pain is worst:  daytime  Exacerbated by:  use  Relieved by:  cold  Progression:  improving  ROM:  Pain Report:  - none    + mild    ++ moderate    +++ severe   Elbow  7/27/2017   AROM(PROM) Left Right   Extension  Full   Flexion  Full   Supination  Stretch pain   Pronation  Full     ROM  Wrist 7/27/17   AROM (PROM) Right   Extension Full   Flexion Full   RD Full   UD Full     Palaption: pain scale of 0-10/10  Date 7/27/2017 8/3/17   Side Right Right   MEP +    Flexor Wad Origin +    Cubital Tunnel (just proximal) +    PT +    Wrist  + radial side      Other Provocative Testing 7/27/2017       Resisted APL/EPB -       Finkelsteins  \"pull\"                                   Palpation Ulnar Nerve Path: Pain level report on scale 0-10/10  Date 7/27/2017   Side Right   [place] -   Flexor Wad Origin +   Cubital Tunnel -   [place] -   Subscapularis +     Range of Motion Ring Finger AROM (PROM):  Date 7/27/2017   Side Right   MP ext Full   PIP ext Full   DIP ext Full     Range of Motion Small Finger AROM (PROM):  Date 7/27/2017   Side Right   MP ext Full   PIP ext Full   DIP ext Full     Special Tests:  Pain Report:  - none    + mild    ++ moderate    +++ severe   Date 7/27/2017   Side Right   Elbow Flexion Test -   Tinels at [place] -   Tinels at Cubital Tunnel -   Ulnar Nerve ULTT 50 %   Paresthesias At HS   [place]'s Sign -     MMT: MMT scale of 0-5/5, pain scale of 0-10/10  Date 7/27/2017   Side Right   Elbow flex in neutral 5/5   Elbow " flex in pronation 5/5   Wrist flexion 5/5   Pronation 5/5     Ulnar Nerve MMT: (Scale 0/5)  Date 7/27/2017   Side Right   FCU 5/5-   FDP 3 & 4 5/5   ADM 5/5   ODM 5/5   FDM 5/5   Palmar Interossei 4-/5   Dorsal Interossei 4-/5   Lumbricals 3&4 5/5   Adductor Pollicis 5/5   FPB 5/5     Strength:   (Measured in pounds)  Pain Report:  - none    + mild    ++ moderate    +++ severe     7/27/2017   Trials Left Right   1  2  3     Average: 70# 78#     Lat Pinch  7/27/2017   Trials Left Right   1  2  3     Average: 20# 20#     3 Pt Pinch  7/27/2017   Trials Left Right   1  2  3     Average: 19# 19#       Home Exercise Program:  Flexor and extensor wad stretches  MFR to flexors with pro/sup and wrist flex/ext  Pec stretch on wall while trigger pointing tight pec major with tennis ball  Pec minor trigger massage over a towel on table with a tennis ball  Golf ball to flexors and volar hand  FA flexor stretches  FM MEP prn  Icing  Scapular squeezes    Next Visit:  Infared laser  MFR  Nerve gliding  Review back strengthening

## 2017-08-03 NOTE — MR AVS SNAPSHOT
"              After Visit Summary   8/3/2017    Wesley Art    MRN: [id number]           Patient Information     Date Of Birth          1955        Visit Information        Provider Department      8/3/2017 7:30 AM Devika Pereira [facility]        Today's Diagnoses     Elbow pain        Cervical radiculopathy           Follow-ups after your visit        Your next 10 appointments already scheduled     Aug 15, 2017 12:30 PM CDT   VENESSA Hand with Devika Kelli   [facility] ([facility])    [address] [place] [id number] [phone]              Who to contact     If you have questions or need follow up information about today's clinic visit or your schedule please contact [facility] directly at [phone].  Normal or non-critical lab and imaging results will be communicated to you by MyChart, letter or phone within 4 business days after the clinic has received the results. If you do not hear from us within 7 days, please contact the clinic through MyChart or phone. If you have a critical or abnormal lab result, we will notify you by phone as soon as possible.  Submit refill requests through [employer] or call your pharmacy and they will forward the refill request to us. Please allow 3 business days for your refill to be completed.          Additional Information About Your Visit        [employer] Information     [employer] lets you send messages to your doctor, view your test results, renew your prescriptions, schedule appointments and more. To sign up, go to [email or web address] . Click on \"Log in\" on the left side of the screen, which will take you to the Welcome page. Then click on \"Sign up Now\" on the right side of the page.     You will be asked to enter the access code listed below, as well as some personal information. Please follow the directions to create your username and password.     Your access code is: [email or web address]  Expires: 10/10/2017 " 10:17 AM     Your access code will  in 90 days. If you need help or a new code, please call your [facility] or [phone].        Care EveryWhere ID     This is your Care EveryWhere ID. This could be used by other organizations to access your [place] medical records  [id number]         Blood Pressure from Last 3 Encounters:   17 132/82   16 (!) 146/97   16 134/80    Weight from Last 3 Encounters:   17 118.4 kg (261 lb)   16 108.9 kg (240 lb)   10/31/16 114.3 kg (252 lb)              We Performed the Following     INFRARED THERAPY     MANUAL THER TECH,1+REGIONS,EA 15 MIN     THERAPEUTIC EXERCISES        Primary Care Provider Office Phone # Fax #    Ailyn Geraldine Gutierrez [phone]       [facility] [address] [place] [address]        Equal Access to Services     [facility] : Hadii aad ku hadasho Soomaali, waaxda luqadaha, qaybta kaalmada adeegyada, waxay idiin hayaan matilde herrera . So [facility] [phone].    ATENCIÓN: Si kurtis galdamez, tiene a salinas disposición servicios gratuitos de asistencia lingüística. Llame al [phone].    We comply with applicable federal civil rights laws and [place] laws. We do not discriminate on the basis of race, color, national origin, age, disability sex, sexual orientation or gender identity.            Thank you!     Thank you for choosing [facility]  for your care. Our goal is always to provide you with excellent care. Hearing back from our patients is one way we can continue to improve our services. Please take a few minutes to complete the written survey that you may receive in the mail after your visit with us. Thank you!             Your Updated Medication List - Protect others around you: Learn how to safely use, store and throw away your medicines at www.disposemymeds.org.          This list is accurate as of: 8/3/17  8:04 AM.  Always use your most recent med list.                    Brand Name Dispense Instructions for use Diagnosis    B-12 (Methylcobalamin) 1000 MCG Subl     90 tablet    Place 1 tablet under the tongue every other day    H/O gastric bypass, B12 deficiency       buPROPion 100 MG 12 hr tablet    WELLBUTRIN SR    180 tablet    Take 1 tablet (100 mg) by mouth 2 times daily    Recurrent major depressive disorder, in partial remission (H)       doxazosin 4 MG tablet    CARDURA    135 tablet    Take 1.5 tablets (6 mg) by mouth At Bedtime    Benign prostatic hyperplasia with urinary frequency       finasteride 5 MG tablet    PROSCAR    30 tablet    Take 1 tablet (5 mg) by mouth daily    Benign prostatic hyperplasia with urinary frequency       MULTI COMPLETE PO      Take  by mouth.        omeprazole 20 MG CR capsule    priLOSEC    90 capsule    Take 1 capsule by mouth daily.    Esophageal reflux       sertraline 50 MG tablet    ZOLOFT    135 tablet    Take 1.5 tablets (75 mg) by mouth daily    Recurrent major depressive disorder, in partial remission (H), LU (generalized anxiety disorder)       VITAMIN C PO      Take 1,000 mg by mouth daily        vitamin D 2000 UNITS tablet     100 tablet    Take 2,000 Units by mouth 2 times daily    Low vitamin D level

## 2017-08-15 ENCOUNTER — THERAPY VISIT (OUTPATIENT)
Dept: OCCUPATIONAL THERAPY | Facility: CLINIC | Age: 62
End: 2017-08-15
Payer: COMMERCIAL

## 2017-08-15 DIAGNOSIS — M25.529 ELBOW PAIN: ICD-10-CM

## 2017-08-15 DIAGNOSIS — M54.12 CERVICAL RADICULOPATHY: ICD-10-CM

## 2017-08-15 PROCEDURE — 97140 MANUAL THERAPY 1/> REGIONS: CPT | Mod: GO | Performed by: OCCUPATIONAL THERAPIST

## 2017-08-15 PROCEDURE — 97110 THERAPEUTIC EXERCISES: CPT | Mod: GO | Performed by: OCCUPATIONAL THERAPIST

## 2017-08-15 NOTE — MR AVS SNAPSHOT
"              After Visit Summary   8/15/2017    Wesley Art    MRN: [id number]           Patient Information     Date Of Birth          1955        Visit Information        Provider Department      8/15/2017 12:30 PM Devika Pereira [facility]        Today's Diagnoses     Elbow pain        Cervical radiculopathy           Follow-ups after your visit        Your next 10 appointments already scheduled     Sep 08, 2017  8:30 AM CDT   VENESSA Hand with Devika Kelli   [facility] ([facility])    [address] [place] [id number] [phone]              Who to contact     If you have questions or need follow up information about today's clinic visit or your schedule please contact [facility] directly at [phone].  Normal or non-critical lab and imaging results will be communicated to you by MyChart, letter or phone within 4 business days after the clinic has received the results. If you do not hear from us within 7 days, please contact the clinic through [employer] or phone. If you have a critical or abnormal lab result, we will notify you by phone as soon as possible.  Submit refill requests through D[employer] or call your pharmacy and they will forward the refill request to us. Please allow 3 business days for your refill to be completed.          Additional Information About Your Visit        [employer] Information     Dtime lets you send messages to your doctor, view your test results, renew your prescriptions, schedule appointments and more. To sign up, go to [email or web address] . Click on \"Log in\" on the left side of the screen, which will take you to the Welcome page. Then click on \"Sign up Now\" on the right side of the page.     You will be asked to enter the access code listed below, as well as some personal information. Please follow the directions to create your username and password.     Your access code is: [email or web address]  Expires: " 10/10/2017 10:17 AM     Your access code will  in 90 days. If you need help or a new code, please call your [place] clinic or [phone].        Care EveryWhere ID     This is your Care EveryWhere ID. This could be used by other organizations to access your [place] medical records  [id number]         Blood Pressure from Last 3 Encounters:   17 132/82   16 (!) 146/97   16 134/80    Weight from Last 3 Encounters:   17 118.4 kg (261 lb)   16 108.9 kg (240 lb)   10/31/16 114.3 kg (252 lb)              We Performed the Following     MANUAL THER TECH,1+REGIONS,EA 15 MIN     THERAPEUTIC EXERCISES        Primary Care Provider Office Phone # Fax #    Ailyn Geraldine Gutierrez [phone]       [facility] [address]  [facility] [address]        Equal Access to Services     [facility] : Hadii aad ku hadasho Soomaali, waaxda luqadaha, qaybta kaalmada adeegyada, waxay idiin hayaan adeeg kharash la'aan . So [facility] [phone].    ATENCIÓN: Si habla español, tiene a salinas disposición servicios gratuitos de asistencia lingüística. Llame al [phone].    We comply with applicable federal civil rights laws and [place] laws. We do not discriminate on the basis of race, color, national origin, age, disability sex, sexual orientation or gender identity.            Thank you!     Thank you for choosing [facility]  for your care. Our goal is always to provide you with excellent care. Hearing back from our patients is one way we can continue to improve our services. Please take a few minutes to complete the written survey that you may receive in the mail after your visit with us. Thank you!             Your Updated Medication List - Protect others around you: Learn how to safely use, store and throw away your medicines at www.disposemymeds.org.          This list is accurate as of: 8/15/17  2:19 PM.  Always use your most recent med list.                    Brand Name Dispense Instructions for use Diagnosis    B-12 (Methylcobalamin) 1000 MCG Subl     90 tablet    Place 1 tablet under the tongue every other day    H/O gastric bypass, B12 deficiency       buPROPion 100 MG 12 hr tablet    WELLBUTRIN SR    180 tablet    Take 1 tablet (100 mg) by mouth 2 times daily    Recurrent major depressive disorder, in partial remission (H)       doxazosin 4 MG tablet    CARDURA    135 tablet    Take 1.5 tablets (6 mg) by mouth At Bedtime    Benign prostatic hyperplasia with urinary frequency       finasteride 5 MG tablet    PROSCAR    30 tablet    Take 1 tablet (5 mg) by mouth daily    Benign prostatic hyperplasia with urinary frequency       MULTI COMPLETE PO      Take  by mouth.        omeprazole 20 MG CR capsule    priLOSEC    90 capsule    Take 1 capsule by mouth daily.    Esophageal reflux       sertraline 50 MG tablet    ZOLOFT    135 tablet    Take 1.5 tablets (75 mg) by mouth daily    Recurrent major depressive disorder, in partial remission (H), LU (generalized anxiety disorder)       VITAMIN C PO      Take 1,000 mg by mouth daily        vitamin D 2000 UNITS tablet     100 tablet    Take 2,000 Units by mouth 2 times daily    Low vitamin D level

## 2017-08-15 NOTE — PROGRESS NOTES
"SOAP note objective information for 8/15/2017    Please refer to the daily flowsheet for treatment today, total treatment time and time spent performing 1:1 timed codes.       Pain Level Report  VAS(0-10) 7/27/2017 8/3/2017 8/15/2017   At Rest: Elbow: 2-3 with pressure  Hand: 2 stiff Elbow: 1 with pressure  Hand: 2 stiff Elbow: 0  Hand: 2 stiff   With Use: Elbow: 6-7  Hand 7-8 Elbow: 4  Hand 6 Elbow: 2  Hand: 4     Report of Pain:  Location:  elbow and hand  Pain Quality:  Aching and Dull  Frequency: constant    Pain is worst:  daytime  Exacerbated by:  use  Relieved by:  cold  Progression:  improving  ROM:  Pain Report:  - none    + mild    ++ moderate    +++ severe   Elbow  7/27/2017   AROM(PROM) Left Right   Extension  Full   Flexion  Full   Supination  Stretch pain   Pronation  Full     ROM  Wrist 7/27/17   AROM (PROM) Right   Extension Full   Flexion Full   RD Full   UD Full     Palaption: pain scale of 0-10/10  Date 7/27/2017 8/15/17   Side Right Right   MEP + -   Flexor Wad Origin + mild   Cubital Tunnel (just proximal) + +   PT + +   Wrist  + radial side +     Other Provocative Testing 7/27/2017 8/15/2017        Resisted APL/EPB -       Finkelsteins  \"pull\" -                                  Palpation Ulnar Nerve Path: Pain level report on scale 0-10/10  Date 7/27/2017 8/15/2017   Side Right Right   [place] -    Flexor Wad Origin + +   Cubital Tunnel -    [place] -    Subscapularis + +     Range of Motion Ring Finger AROM (PROM):  Date 7/27/2017   Side Right   MP ext Full   PIP ext Full   DIP ext Full     Range of Motion Small Finger AROM (PROM):  Date 7/27/2017   Side Right   MP ext Full   PIP ext Full   DIP ext Full     Special Tests:  Pain Report:  - none    + mild    ++ moderate    +++ severe   Date 7/27/2017    Side Right Right   Elbow Flexion Test -    [place] -    [place]inels at Cubital Tunnel -    Ulnar Nerve ULTT 50 %    Paresthesias At HS    [place]'s Sign -  "     MMT: MMT scale of 0-5/5, pain scale of 0-10/10  Date 7/27/2017   Side Right   Elbow flex in neutral 5/5   Elbow flex in pronation 5/5   Wrist flexion 5/5   Pronation 5/5     Ulnar Nerve MMT: (Scale 0/5)  Date 7/27/2017   Side Right   FCU 5/5-   FDP 3 & 4 5/5   ADM 5/5   ODM 5/5   FDM 5/5   Palmar Interossei 4-/5   Dorsal Interossei 4-/5   Lumbricals 3&4 5/5   Adductor Pollicis 5/5   FPB 5/5     Strength:   (Measured in pounds)  Pain Report:  - none    + mild    ++ moderate    +++ severe     7/27/2017   Trials Left Right   1  2  3     Average: 70# 78#     Lat Pinch  7/27/2017   Trials Left Right   1  2  3     Average: 20# 20#     3 Pt Pinch  7/27/2017   Trials Left Right   1  2  3     Average: 19# 19#       Home Exercise Program:  Flexor and extensor wad stretches  MFR to flexors with pro/sup and wrist flex/ext  Pec stretch on wall while trigger pointing tight pec major with tennis ball  Pec minor trigger massage over a towel on table with a tennis ball  Golf ball to flexors and volar hand  FA flexor stretches  FM MEP prn  Icing  Scapular squeezes  Ulnar nerve glide  Foam roller    Next Visit:  Infared laser  MFR  Nerve gliding  Review back strengthening

## 2017-10-03 ENCOUNTER — TELEPHONE (OUTPATIENT)
Dept: FAMILY MEDICINE | Facility: CLINIC | Age: 62
End: 2017-10-03

## 2017-10-03 NOTE — TELEPHONE ENCOUNTER
Received fax from [employer] that patients rx for Doxazosin 4 mg  1 1/2 a day needs PA.  Submitted PA on covermymeds.  Received fax back stating PA was approved.  Approval dates 09/02/2017-10/01/2020.  Called [employer] to inform them of approval.    Case ID # [id number].

## 2017-10-20 PROBLEM — M25.529 ELBOW PAIN: Status: RESOLVED | Noted: 2017-07-27 | Resolved: 2017-10-20

## 2017-10-20 PROBLEM — M54.12 CERVICAL RADICULOPATHY: Status: RESOLVED | Noted: 2017-07-12 | Resolved: 2017-10-20

## 2017-10-20 NOTE — PROGRESS NOTES
Discharge Note -Hand Therapy    Initial Evaluation Date:  7/27/2017  Current Date: 10/20/2017  Number of Visits: 3    S:    Pt did not follow up for scheduled visits.    O:  Objective information is not available as pt has not returned for therapy.  Last visit or last objective information will serve as final entry.      A: Pt did not return for further treatment.    Status of goals is unknown due to lack of followup of patient.      P:  Discharge from Hand Therapy.

## 2017-11-02 DIAGNOSIS — N40.1 BENIGN PROSTATIC HYPERPLASIA WITH URINARY FREQUENCY: ICD-10-CM

## 2017-11-02 DIAGNOSIS — R35.0 BENIGN PROSTATIC HYPERPLASIA WITH URINARY FREQUENCY: ICD-10-CM

## 2017-11-03 RX ORDER — FINASTERIDE 5 MG/1
5 TABLET, FILM COATED ORAL DAILY
Qty: 90 TABLET | Refills: 2 | Status: SHIPPED | OUTPATIENT
Start: 2017-11-03 | End: 2018-05-09

## 2017-11-12 DIAGNOSIS — F33.41 RECURRENT MAJOR DEPRESSIVE DISORDER, IN PARTIAL REMISSION (H): ICD-10-CM

## 2017-11-13 NOTE — TELEPHONE ENCOUNTER
BUPROPION SR - Last OV & Last Labs 7/12/17  Janey Parsons MA      PHQ-9 SCORE 7/7/2016 8/18/2016 7/12/2017   Total Score - - -   Total Score 7 9 6

## 2017-11-22 RX ORDER — BUPROPION HYDROCHLORIDE 100 MG/1
TABLET, EXTENDED RELEASE ORAL
Qty: 180 TABLET | Refills: 0 | Status: SHIPPED | OUTPATIENT
Start: 2017-11-22 | End: 2018-02-20

## 2018-01-15 ENCOUNTER — DOCUMENTATION ONLY (OUTPATIENT)
Dept: SURGERY | Facility: CLINIC | Age: 63
End: 2018-01-15

## 2018-02-20 DIAGNOSIS — F33.41 RECURRENT MAJOR DEPRESSIVE DISORDER, IN PARTIAL REMISSION (H): ICD-10-CM

## 2018-02-20 RX ORDER — BUPROPION HYDROCHLORIDE 100 MG/1
TABLET, EXTENDED RELEASE ORAL
Qty: 180 TABLET | Refills: 0 | Status: SHIPPED | OUTPATIENT
Start: 2018-02-20 | End: 2018-05-10

## 2018-02-20 NOTE — TELEPHONE ENCOUNTER
buPROPion (WELLBUTRIN SR) 100 MG 12 hr tablet   LAST  Med check 7/22/17 cpx   last labs(for RX) 7/12/17  Next  appt scheduled =  none  Winsome Elias MA    PHQ-9 SCORE 7/7/2016 8/18/2016 7/12/2017   Total Score - - -   Total Score 7 9 6

## 2018-05-09 DIAGNOSIS — F33.41 RECURRENT MAJOR DEPRESSIVE DISORDER, IN PARTIAL REMISSION (H): ICD-10-CM

## 2018-05-09 DIAGNOSIS — R35.0 BENIGN PROSTATIC HYPERPLASIA WITH URINARY FREQUENCY: ICD-10-CM

## 2018-05-09 DIAGNOSIS — N40.1 BENIGN PROSTATIC HYPERPLASIA WITH URINARY FREQUENCY: ICD-10-CM

## 2018-05-09 DIAGNOSIS — F41.1 GAD (GENERALIZED ANXIETY DISORDER): ICD-10-CM

## 2018-05-10 DIAGNOSIS — N40.1 BENIGN PROSTATIC HYPERPLASIA WITH URINARY FREQUENCY: ICD-10-CM

## 2018-05-10 DIAGNOSIS — R35.0 BENIGN PROSTATIC HYPERPLASIA WITH URINARY FREQUENCY: ICD-10-CM

## 2018-05-10 DIAGNOSIS — F33.41 RECURRENT MAJOR DEPRESSIVE DISORDER, IN PARTIAL REMISSION (H): ICD-10-CM

## 2018-05-11 RX ORDER — FINASTERIDE 5 MG/1
5 TABLET, FILM COATED ORAL DAILY
Qty: 90 TABLET | Refills: 0 | Status: SHIPPED | OUTPATIENT
Start: 2018-05-11 | End: 2018-08-01

## 2018-05-11 RX ORDER — BUPROPION HYDROCHLORIDE 100 MG/1
TABLET, EXTENDED RELEASE ORAL
Qty: 180 TABLET | Refills: 0 | Status: SHIPPED | OUTPATIENT
Start: 2018-05-11 | End: 2018-07-27

## 2018-05-11 RX ORDER — DOXAZOSIN 4 MG/1
6 TABLET ORAL AT BEDTIME
Qty: 135 TABLET | Refills: 0 | Status: SHIPPED | OUTPATIENT
Start: 2018-05-11 | End: 2018-07-27

## 2018-05-14 ENCOUNTER — TELEPHONE (OUTPATIENT)
Dept: FAMILY MEDICINE | Facility: CLINIC | Age: 63
End: 2018-05-14

## 2018-05-14 NOTE — TELEPHONE ENCOUNTER
Submitted PA to Med D on 05/09 for  Doxazosin.   Received fax back from [facility] stating PA was approved.  Approval dates 04/01/2018-05/10/2021.

## 2018-06-23 ENCOUNTER — HEALTH MAINTENANCE LETTER (OUTPATIENT)
Age: 63
End: 2018-06-23

## 2018-06-26 ENCOUNTER — MEDICAL CORRESPONDENCE (OUTPATIENT)
Dept: FAMILY MEDICINE | Facility: CLINIC | Age: 63
End: 2018-06-26

## 2018-07-03 DIAGNOSIS — F41.1 GAD (GENERALIZED ANXIETY DISORDER): ICD-10-CM

## 2018-07-03 DIAGNOSIS — F33.41 RECURRENT MAJOR DEPRESSIVE DISORDER, IN PARTIAL REMISSION (H): ICD-10-CM

## 2018-07-17 ENCOUNTER — OFFICE VISIT (OUTPATIENT)
Dept: FAMILY MEDICINE | Facility: CLINIC | Age: 63
End: 2018-07-17

## 2018-07-17 VITALS
HEART RATE: 66 BPM | SYSTOLIC BLOOD PRESSURE: 140 MMHG | BODY MASS INDEX: 36.6 KG/M2 | DIASTOLIC BLOOD PRESSURE: 88 MMHG | HEIGHT: 72 IN | WEIGHT: 270.2 LBS

## 2018-07-17 DIAGNOSIS — E78.5 HYPERLIPIDEMIA LDL GOAL <100: ICD-10-CM

## 2018-07-17 DIAGNOSIS — F33.41 RECURRENT MAJOR DEPRESSIVE DISORDER, IN PARTIAL REMISSION (H): ICD-10-CM

## 2018-07-17 DIAGNOSIS — F41.1 GAD (GENERALIZED ANXIETY DISORDER): ICD-10-CM

## 2018-07-17 DIAGNOSIS — R44.2 OLFACTORY HALLUCINATION: ICD-10-CM

## 2018-07-17 DIAGNOSIS — R73.02 GLUCOSE INTOLERANCE (IMPAIRED GLUCOSE TOLERANCE): ICD-10-CM

## 2018-07-17 DIAGNOSIS — Z23 NEED FOR PNEUMOCOCCAL VACCINE: ICD-10-CM

## 2018-07-17 DIAGNOSIS — N40.1 BENIGN NODULAR PROSTATIC HYPERPLASIA WITH LOWER URINARY TRACT SYMPTOMS: ICD-10-CM

## 2018-07-17 DIAGNOSIS — M48.9 CERVICAL SPINE DISEASE: ICD-10-CM

## 2018-07-17 DIAGNOSIS — E66.01 MORBID OBESITY (H): ICD-10-CM

## 2018-07-17 DIAGNOSIS — Z00.00 MEDICARE ANNUAL WELLNESS VISIT, SUBSEQUENT: Primary | ICD-10-CM

## 2018-07-17 PROCEDURE — G0439 PPPS, SUBSEQ VISIT: HCPCS | Performed by: FAMILY MEDICINE

## 2018-07-17 PROCEDURE — 99214 OFFICE O/P EST MOD 30 MIN: CPT | Mod: 25 | Performed by: FAMILY MEDICINE

## 2018-07-17 PROCEDURE — 92551 PURE TONE HEARING TEST AIR: CPT | Performed by: FAMILY MEDICINE

## 2018-07-17 RX ORDER — SERTRALINE HYDROCHLORIDE 100 MG/1
100 TABLET, FILM COATED ORAL DAILY
Qty: 90 TABLET | Refills: 3 | Status: SHIPPED | OUTPATIENT
Start: 2018-07-17 | End: 2019-06-07

## 2018-07-17 ASSESSMENT — ANXIETY QUESTIONNAIRES
2. NOT BEING ABLE TO STOP OR CONTROL WORRYING: SEVERAL DAYS
5. BEING SO RESTLESS THAT IT IS HARD TO SIT STILL: NOT AT ALL
6. BECOMING EASILY ANNOYED OR IRRITABLE: MORE THAN HALF THE DAYS
1. FEELING NERVOUS, ANXIOUS, OR ON EDGE: MORE THAN HALF THE DAYS
GAD7 TOTAL SCORE: 7
3. WORRYING TOO MUCH ABOUT DIFFERENT THINGS: SEVERAL DAYS
7. FEELING AFRAID AS IF SOMETHING AWFUL MIGHT HAPPEN: NOT AT ALL

## 2018-07-17 ASSESSMENT — PATIENT HEALTH QUESTIONNAIRE - PHQ9: 5. POOR APPETITE OR OVEREATING: SEVERAL DAYS

## 2018-07-17 NOTE — LETTER
My Depression Action Plan  Name: Wesley Art   Date of Birth 1955  Date: 7/17/2018    My doctor: Ailyn Gutierrez   My clinic: [facility] [address] [place] [address] [phone]          GREEN    ZONE   Good Control    What it looks like:     Things are going generally well. You have normal up s and down s. You may even feel depressed from time to time, but bad moods usually last less than a day.   What you need to do:  1. Continue to care for yourself (see self care plan)  2. Check your depression survival kit and update it as needed  3. Follow your physician s recommendations including any medication.  4. Do not stop taking medication unless you consult with your physician first.           YELLOW         ZONE Getting Worse    What it looks like:     Depression is starting to interfere with your life.     It may be hard to get out of bed; you may be starting to isolate yourself from others.    Symptoms of depression are starting to last most all day and this has happened for several days.     You may have suicidal thoughts but they are not constant.   What you need to do:     1. Call your care team, your response to treatment will improve if you keep your care team informed of your progress. Yellow periods are signs an adjustment may need to be made.     2. Continue your self-care, even if you have to fake it!    3. Talk to someone in your support network    4. Open up your depression survival kit           RED    ZONE Medical Alert - Get Help    What it looks like:     Depression is seriously interfering with your life.     You may experience these or other symptoms: You can t get out of bed most days, can t work or engage in other necessary activities, you have trouble taking care of basic hygiene, or basic responsibilities, thoughts of suicide or death that will not go away, self-injurious behavior.     What you need to do:  1. Call your care team  and request a same-day appointment. If they are not available (weekends or after hours) call your local crisis line, emergency room or 911.            Depression Self Care Plan / Survival Kit    Self-Care for Depression  Here s the deal. Your body and mind are really not as separate as most people think.  What you do and think affects how you feel and how you feel influences what you do and think. This means if you do things that people who feel good do, it will help you feel better.  Sometimes this is all it takes.  There is also a place for medication and therapy depending on how severe your depression is, so be sure to consult with your medical provider and/ or Behavioral Health Consultant if your symptoms are worsening or not improving.     In order to better manage my stress, I will:    Exercise  Get some form of exercise, every day. This will help reduce pain and release endorphins, the  feel good  chemicals in your brain. This is almost as good as taking antidepressants!  This is not the same as joining a gym and then never going! (they count on that by the way ) It can be as simple as just going for a walk or doing some gardening, anything that will get you moving.      Hygiene   Maintain good hygiene (Get out of bed in the morning, Make your bed, Brush your teeth, Take a shower, and Get dressed like you were going to work, even if you are unemployed).  If your clothes don't fit try to get ones that do.    Diet  I will strive to eat foods that are good for me, drink plenty of water, and avoid excessive sugar, caffeine, alcohol, and other mood-altering substances.  Some foods that are helpful in depression are: complex carbohydrates, B vitamins, flaxseed, fish or fish oil, fresh fruits and vegetables.    Psychotherapy  I agree to participate in Individual Therapy (if recommended).    Medication  If prescribed medications, I agree to take them.  Missing doses can result in serious side effects.  I understand  that drinking alcohol, or other illicit drug use, may cause potential side effects.  I will not stop my medication abruptly without first discussing it with my provider.    Staying Connected With Others  I will stay in touch with my friends, family members, and my primary care provider/team.    Use your imagination  Be creative.  We all have a creative side; it doesn t matter if it s oil painting, sand castles, or mud pies! This will also kick up the endorphins.    Witness Beauty  (AKA stop and smell the roses) Take a look outside, even in mid-winter. Notice colors, textures. Watch the squirrels and birds.     Service to others  Be of service to others.  There is always someone else in need.  By helping others we can  get out of ourselves  and remember the really important things.  This also provides opportunities for practicing all the other parts of the program.    Humor  Laugh and be silly!  Adjust your TV habits for less news and crime-drama and more comedy.    Control your stress  Try breathing deep, massage therapy, biofeedback, and meditation. Find time to relax each day.     My support system    Clinic Contact:  Phone number:    Contact 1:  Phone number:    Contact 2:  Phone number:    Faith/:  Phone number:    Therapist:  Phone number:    Local crisis center:    Phone number:    Other community support:  Phone number:

## 2018-07-17 NOTE — MR AVS SNAPSHOT
After Visit Summary   7/17/2018    Wesley Art    MRN: [id number]           Patient Information     Date Of Birth          1955        Visit Information        Provider Department      7/17/2018 9:00 AM Ailyn Gutierrez MD [facility]        Today's Diagnoses     Medicare annual wellness visit, subsequent    -  1    Need for pneumococcal vaccine        Recurrent major depressive disorder, in partial remission (H)        LU (generalized anxiety disorder)        Morbid obesity (H)        Glucose intolerance (impaired glucose tolerance)        Hyperlipidemia LDL goal <100        Benign nodular prostatic hyperplasia with lower urinary tract symptoms        Cervical spine disease        Olfactory hallucination          Care Instructions      Preventive Health Recommendations:       Male Ages 65 and over    Yearly exam:             See your health care provider every year in order to  o   Review health changes.   o   Discuss preventive care.    o   Review your medicines if your doctor has prescribed any.    Talk with your health care provider about whether you should have a test to screen for prostate cancer (PSA).    Every 3 years, have a diabetes test (fasting glucose). If you are at risk for diabetes, you should have this test more often.    Every 5 years, have a cholesterol test. Have this test more often if you are at risk for high cholesterol or heart disease.     Every 10 years, have a colonoscopy. Or, have a yearly FIT test (stool test). These exams will check for colon cancer.    Talk to with your health care provider about screening for Abdominal Aortic Aneurysm if you have a family history of AAA or have a history of smoking.  Shots:     Get a flu shot each year.     Get a tetanus shot every 10 years.     Talk to your doctor about your pneumonia vaccines. There are now two you should receive - Pneumovax (PPSV 23) and Prevnar (PCV 13).    Talk to your pharmacist  about a shingles vaccine.     Talk to your doctor about the hepatitis B vaccine.  Nutrition:     Eat at least 5 servings of fruits and vegetables each day.     Eat whole-grain bread, whole-wheat pasta and brown rice instead of white grains and rice.     Get adequate Calcium and Vitamin D.   Lifestyle    Exercise for at least 150 minutes a week (30 minutes a day, 5 days a week). This will help you control your weight and prevent disease.     Limit alcohol to one drink per day.     No smoking.     Wear sunscreen to prevent skin cancer.     See your dentist every six months for an exam and cleaning.     See your eye doctor every 1 to 2 years to screen for conditions such as glaucoma, macular degeneration and cataracts.          Follow-ups after your visit        Additional Services     Referral to [facility]       Referral to [facility]    Phone: [phone]  Reason for Referral:  Ref to Dr Todd Bai                                    Cervical Spine Disease    Please be aware that coverage of these services is subject to the terms and limitations of your health insurance plan.  Call member services at your health plan with any benefit or coverage questions.            Referral to [facility]       Referral to [facility] Dr. Gonzalez  Phone:  [phone]  Reason for Referral:  BPH    Please be aware that coverage of these services is subject to the terms and limitations of your health insurance plan.  Call member services at your health plan with any benefit or coverage questions.                  Who to contact     If you have questions or need follow up information about today's clinic visit or your schedule please contact [facility] directly at [phone].  Normal or non-critical lab and imaging results will be communicated to you by MyChart, letter or phone within 4 business days after the clinic has received the results. If you  "do not hear from us within 7 days, please contact the clinic through [email or web address] or phone. If you have a critical or abnormal lab result, we will notify you by phone as soon as possible.  Submit refill requests through [email or web address] or call your pharmacy and they will forward the refill request to us. Please allow 3 business days for your refill to be completed.          Additional Information About Your Visit        [employer] Information     [email or web address] lets you send messages to your doctor, view your test results, renew your prescriptions, schedule appointments and more. To sign up, go to [email or web address] . Click on \"Log in\" on the left side of the screen, which will take you to the Welcome page. Then click on \"Sign up Now\" on the right side of the page.     You will be asked to enter the access code listed below, as well as some personal information. Please follow the directions to create your username and password.     Your access code is: [email or web address]  Expires: 10/17/2018  8:08 AM     Your access code will  in 90 days. If you need help or a new code, please call your [facility] clinic or [phone].        Care EveryWhere ID     This is your Care EveryWhere ID. This could be used by other organizations to access your [facility] medical records  [id number]        Your Vitals Were     Pulse Height BMI (Body Mass Index)             66 1.822 m (5' 11.75\") 36.9 kg/m2          Blood Pressure from Last 3 Encounters:   18 140/88   17 132/82   16 (!) 146/97    Weight from Last 3 Encounters:   18 122.6 kg (270 lb 3.2 oz)   17 118.4 kg (261 lb)   16 108.9 kg (240 lb)              We Performed the Following     ADMIN MEDICARE: Pneumococcal Vaccine ()     Comp. Metabolic Panel (14) ([facility])     Hemoglobin A1C ([facility])     Lipid Panel ([facility])     PSA Serum ([facility])     Referral to [facility]     Referral to [facility]     SCREENING TEST, PURE TONE, " AIR ONLY          Today's Medication Changes          These changes are accurate as of 7/17/18 11:59 PM.  If you have any questions, ask your nurse or doctor.               These medicines have changed or have updated prescriptions.        Dose/Directions    sertraline 100 MG tablet   Commonly known as:  ZOLOFT   This may have changed:    - medication strength  - how much to take  - additional instructions   Used for:  Recurrent major depressive disorder, in partial remission (H), LU (generalized anxiety disorder)   Changed by:  Ailyn Gutierrez MD        Dose:  100 mg   Take 1 tablet (100 mg) by mouth daily   Quantity:  90 tablet   Refills:  3            Where to get your medicines      These medications were sent to [facility]  for [address]  [address] [place] [address]     Phone:  [phone]     sertraline 100 MG tablet                Primary Care Provider Office Phone # Fax #    Ailyn Gutierrez [phone] [address] [place] [address]        Equal Access to Services     [facility] AH: Hadii aad ku hadasho Soomaali, waaxda luqadaha, qaybta kaalmada adeegyada, waxay idiin hayaan mtailde kharasusanna herrera . So [facility] [phone].    ATENCIÓN: Si habla español, tiene a salinas disposición servicios gratuitos de asistencia lingüística. [facility] [phone].    We comply with applicable federal civil rights laws and [place] laws. We do not discriminate on the basis of race, color, national origin, age, disability, sex, sexual orientation, or gender identity.            Thank you!     Thank you for choosing [facility]  for your care. Our goal is always to provide you with excellent care. Hearing back from our patients is one way we can continue to improve our services. Please take a few minutes to complete the written survey that you may receive in the mail after your visit with us. Thank you!              Your Updated Medication List - Protect others around you: Learn how to safely use, store and throw away your medicines at www.disposemymeds.org.          This list is accurate as of 7/17/18 11:59 PM.  Always use your most recent med list.                   Brand Name Dispense Instructions for use Diagnosis    B-12 (Methylcobalamin) 1000 MCG Subl     90 tablet    Place 1 tablet under the tongue every other day    H/O gastric bypass, B12 deficiency       Ogema 3 MG Caps      Take 3 mg by mouth 2 times daily        buPROPion 100 MG 12 hr tablet    WELLBUTRIN SR    180 tablet    TAKE 1 TABLET(100 MG) BY MOUTH TWICE DAILY    Recurrent major depressive disorder, in partial remission (H)       doxazosin 4 MG tablet    CARDURA    135 tablet    Take 1.5 tablets (6 mg) by mouth At Bedtime    Benign prostatic hyperplasia with urinary frequency       finasteride 5 MG tablet    PROSCAR    90 tablet    Take 1 tablet (5 mg) by mouth daily    Benign prostatic hyperplasia with urinary frequency       MULTI COMPLETE PO      Take 1 tablet by mouth daily        omeprazole 20 MG CR capsule    priLOSEC    90 capsule    Take 1 capsule by mouth daily.    Esophageal reflux       sertraline 100 MG tablet    ZOLOFT    90 tablet    Take 1 tablet (100 mg) by mouth daily    Recurrent major depressive disorder, in partial remission (H), LU (generalized anxiety disorder)       VITAMIN C PO      Take 1,000 mg by mouth daily        vitamin D 2000 units tablet     100 tablet    Take 2,000 Units by mouth 2 times daily    Low vitamin D level

## 2018-07-17 NOTE — PROGRESS NOTES
SUBJECTIVE:   Wesley Art is a 63 year old male who presents for Preventive Visit.  ISSUES TO ADDRESS TODAY:  1. Anxiety and depression: having a hard couple of years with deaths of friends and family, moving and selling a home. He stays in  but his sponsor  2 weeks ago. He denies any alcohol use. He feels void of emotion. He previously was easy to cry. He takes sertraline and Wellbutrin. He thought that Prozac previously took away his emotions. He noted improvement two years ago when he added sertraline to the Wellbutrin.   2. BPH: initially did much better when Proscar was added to Cardura. He is now getting up more at night, his stream is weak and difficult to start.   3. Glucose intolerance: eating more sugar. Has had some weight gain. Due for HgbA1c. Understands diabetic diet, but not following it.   4. Phantom odor of woodsmoke noted commonly, at least weekly, when there is no smoke or source to be found.   5. Hx Cervical spine disease: having worsening symptoms, has had YAEL in the past with some improvement, was seen by neurosurgery in the past and told he needed surgery (I am not able to find these notes in the EMR.      Are you in the first 12 months of your Medicare Part B coverage?  No    Healthy Habits:    Do you get at least three servings of calcium containing foods daily (dairy, green leafy vegetables, etc.)? yes    Amount of exercise or daily activities, outside of work: 3 day(s) per week    Problems taking medications regularly No    Medication side effects: No    Have you had an eye exam in the past two years? yes    Do you see a dentist twice per year? yes    Do you have sleep apnea, excessive snoring or daytime drowsiness?no      Ability to successfully perform activities of daily living: Yes, no assistance needed    Home safety:  none identified     Hearing impairment: No, new onset of tinitis    HEARING FREQUENCY    Right Ear:      1000 Hz RESPONSE- on Level:   20 db   (Conditioning sound)   1000 Hz: RESPONSE- on Level:   20 db    2000 Hz: RESPONSE- on Level:   20 db    4000 Hz: RESPONSE- on Level:   20 db     Left Ear:      4000 Hz: RESPONSE- on Level:   20 db    2000 Hz: RESPONSE- on Level:   20 db    1000 Hz: RESPONSE- on Level:   20 db     500 Hz: RESPONSE- on Level:   20 db    Right Ear:    500 Hz: RESPONSE- on Level:   20 db         Fall risk:  Fallen 2 or more times in the past year?: No  Any fall with injury in the past year?: No        COGNITIVE SCREEN  1) Repeat 3 items (Leader, Season, Table)    2) Clock draw: NORMAL  3) 3 item recall: Recalls 3 objects  Results: 3 items recalled: COGNITIVE IMPAIRMENT LESS LIKELY    Mini[employer]CogTM Copyright S Susan. Licensed by the author for use in [facility] [employer]; reprinted with permission (kayla@[facility]). All rights reserved.            PROBLEMS TO ADD ON...    Reviewed and updated as needed this visit by clinical staff  Tobacco  Allergies  Meds  Med Hx  Surg Hx  Fam Hx  Soc Hx        Reviewed and updated as needed this visit by Provider        Social History   Substance Use Topics     Smoking status: Never Smoker     Smokeless tobacco: Never Used     Alcohol use No      Comment: sober since 11/11/2007       If you drink alcohol do you typically have >3 drinks per day or >7 drinks per week? No                        Today's PHQ-2 Score:   PHQ-2 ( 1999 [employer]) 7/12/2017 7/7/2016   Q1: Little interest or pleasure in doing things 1 2   Q2: Feeling down, depressed or hopeless 1 2   PHQ-2 Score 2 4       Do you feel safe in your environment - Yes    Do you have a Health Care Directive?: No: Advance care planning reviewed with patient; information given to patient to review.    Current providers sharing in care for this patient include:   Patient Care Team:  Ailyn Gutierrez MD as PCP - General (Family Practice)    The following health maintenance items are reviewed in Epic and correct as of today:  Health Maintenance    Topic Date Due     FOOT EXAM Q1 YEAR  03/03/1956     EYE EXAM Q1 YEAR  03/03/1956     HIV SCREEN (SYSTEM ASSIGNED)  03/03/1971     ADVANCE DIRECTIVE PLANNING Q5 YRS  03/03/2008     MICROALBUMIN Q1 YEAR  04/12/2012     COLONOSCOPY Q1 YR  08/31/2016     TSH W/ FREE T4 REFLEX Q2 YEAR  06/12/2017     A1C Q6 MO  01/12/2018     PHQ-9 Q6 MONTHS  01/12/2018     FALL RISK ASSESSMENT  03/03/2018     PNEUMOCOCCAL (1 of 2 - PCV13) 03/03/2018     AORTIC ANEURYSM SCREENING (SYSTEM ASSIGNED)  03/03/2018     CREATININE Q1 YEAR  07/12/2018     LIPID MONITORING Q1 YEAR  07/12/2018     DEPRESSION ACTION PLAN Q1 YR  07/12/2018     INFLUENZA VACCINE (1) 09/01/2018     TETANUS IMMUNIZATION (SYSTEM ASSIGNED)  03/04/2024     HEPATITIS C SCREENING  Completed     Labs reviewed in EPIC  BP Readings from Last 3 Encounters:   07/12/17 132/82   11/11/16 (!) 146/97   11/01/16 134/80    Wt Readings from Last 3 Encounters:   07/12/17 118.4 kg (261 lb)   11/01/16 108.9 kg (240 lb)   10/31/16 114.3 kg (252 lb)                  Patient Active Problem List   Diagnosis     Major depression in partial remission (H)     Primary osteoarthritis involving multiple joints     Benign nodular prostatic hyperplasia with lower urinary tract symptoms     Glucose intolerance (impaired glucose tolerance)     LU (generalized anxiety disorder)     Past Surgical History:   Procedure Laterality Date     APPENDECTOMY  child     CHOLECYSTECTOMY, LAPOROSCOPIC  1998    Cholecystectomy, Laparoscopic     LAPAROSCOPIC REVISION GASTROPLASTY TO MACIEJ-EN-Y  2006     VASECTOMY  1980       Social History   Substance Use Topics     Smoking status: Never Smoker     Smokeless tobacco: Never Used     Alcohol use No      Comment: sober since 11/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 Father          Current Outpatient Prescriptions   Medication Sig Dispense Refill     Ascorbic Acid (VITAMIN C PO) Take 1,000 mg by mouth daily        B-12, Methylcobalamin,  "1000 MCG SUBL Place 1 tablet under the tongue every other day 90 tablet 1     Boron 3 MG CAPS Take 3 mg by mouth 2 times daily       buPROPion (WELLBUTRIN SR) 100 MG 12 hr tablet TAKE 1 TABLET(100 MG) BY MOUTH TWICE DAILY 180 tablet 0     Cholecalciferol (VITAMIN D) 2000 UNITS tablet Take 2,000 Units by mouth 2 times daily 100 tablet 3     doxazosin (CARDURA) 4 MG tablet Take 1.5 tablets (6 mg) by mouth At Bedtime 135 tablet 0     finasteride (PROSCAR) 5 MG tablet Take 1 tablet (5 mg) by mouth daily 90 tablet 0     Multiple Vitamins-Minerals (MULTI COMPLETE PO) Take 1 tablet by mouth daily        omeprazole (PRILOSEC) 20 MG capsule Take 1 capsule by mouth daily. 90 capsule 3     sertraline (ZOLOFT) 50 MG tablet Take 1.5 tablets (75 mg) by mouth daily Needs to be seen prior to further refill (Patient taking differently: Take 75 mg by mouth daily ) 45 tablet 1     No Known Allergies  Recent Labs   Lab Test  07/12/17   1010  08/01/16   0953  07/07/16   0851  06/12/15   0856   03/04/14   0900   A1C  5.6   --   5.6  5.7*   < >   --    LDL  116*   --    --   93   --   97   HDL  65   --    --   75   --   85   TRIG  126   --    --   86   --   108   ALT  23   --    --   24   --   23   CR  0.92  1.00   --   1.01   --   1.05   POTASSIUM  4.6   --    --   4.3   < >  3.8    < > = values in this interval not displayed.        Pneumonia Vaccine:Adults age 65+ who received Pneumovax (PPSV23) at 65 years or older: Should be given PCV13 > 1 year after their most recent PPSV23    ROS:  Constitutional, HEENT, cardiovascular, pulmonary, gi and gu systems are negative, except as otherwise noted.    OBJECTIVE:   There were no vitals taken for this visit. Estimated body mass index is 35.65 kg/(m^2) as calculated from the following:    Height as of 7/12/17: 1.822 m (5' 11.75\").    Weight as of 7/12/17: 118.4 kg (261 lb).  EXAM:   GENERAL: healthy, alert and no distress  EYES: Eyes grossly normal to inspection, PERRL and conjunctivae and " sclerae normal  HENT: ear canals and TM's normal, nose and mouth without ulcers or lesions  NECK: no adenopathy, no asymmetry, masses, or scars and thyroid normal to palpation  RESP: lungs clear to auscultation - no rales, rhonchi or wheezes  CV: regular rate and rhythm, normal S1 S2, no S3 or S4, no murmur, click or rub, no peripheral edema and peripheral pulses strong  ABDOMEN: soft, nontender, no hepatosplenomegaly, no masses and bowel sounds normal   (male): normal male genitalia without lesions or urethral discharge, no hernia  RECTAL: normal sphincter tone, no rectal masses and prostate mildly enlarged, nontender  MS: no gross musculoskeletal defects noted, no edema  SKIN: no suspicious lesions or rashes  NEURO: Normal strength and tone, mentation intact and speech normal  PSYCH: mentation appears normal, affect normal/bright        ASSESSMENT / PLAN:   Wesley was seen today for medicare visit.    Diagnoses and all orders for this visit:    Medicare annual wellness visit, subsequent  -     SCREENING TEST, PURE TONE, AIR ONLY  Wesley is a healthy appearing 62 yo male. Prevnar is given, other imms are UTD, encouraged ongoing exercise and healthy eating habit.    Need for pneumococcal vaccine  -     Pneumococcal vaccine 23 valent PPSV23  (Pneumovax) [33128]  -     ADMIN MEDICARE: Pneumococcal Vaccine ()    Recurrent major depressive disorder (H)/LU (generalized anxiety disorder)  -     sertraline (ZOLOFT) 100 MG tablet; Take 1 tablet (100 mg) by mouth daily  Dose is increased from 75 mg daily to 100 mg daily.    Morbid obesity (H)  He is offered nutrition counseling which he declines. He knows he needs to be better about exercise and dietary changes.    Glucose intolerance (impaired glucose tolerance)  -     Comp. Metabolic Panel (14) ([facility])  -     Hemoglobin A1C ([facility])  Continue diabetic diet and exercise.    Hyperlipidemia LDL goal <100  -     Lipid Panel ([facility])  -     Comp. Metabolic Panel  "(14) ([facility])    Benign nodular prostatic hyperplasia with lower urinary tract symptoms  -     PSA Serum ([facility])  -     [facility] to [facility]    Olfactory hallucination  Etiology unknown, no history of epilepsy, migraine headaches, schizophrenia.   Referral given for brain MRI to rule out CVA or tumor.    Cervical spine disease  -     [facility]erral to [facility]  I defer to their knowledge of his case to decide the best treatment course.      End of Life Planning:  Patient currently has an advanced directive: No.  I have verified the patient's ablity to prepare an advanced directive/make health care decisions.  Literature was provided to assist patient in preparing an advanced directive.    COUNSELING:  Reviewed preventive health counseling, as reflected in patient instructions       Regular exercise       Healthy diet/nutrition       Vision screening       Hearing screening       Immunizations    Vaccinated for: Pneumococcal             Colon cancer screening       Prostate cancer screening    BP Readings from Last 1 Encounters:   07/12/17 132/82     Estimated body mass index is 35.65 kg/(m^2) as calculated from the following:    Height as of 7/12/17: 1.822 m (5' 11.75\").    Weight as of 7/12/17: 118.4 kg (261 lb).    BP Screening:   Last 3 BP Readings:    BP Readings from Last 3 Encounters:   07/17/18 140/88   07/12/17 132/82   11/11/16 (!) 146/97       The following was recommended to the patient:  Re-screen within 4 weeks and recommend lifestyle modifications  Weight management plan: Discussed healthy diet and exercise guidelines and patient will follow up in 12 months in clinic to re-evaluate.     reports that he has never smoked. He has never used smokeless tobacco.      Appropriate preventive services were discussed with this patient, including applicable screening as appropriate for cardiovascular disease, diabetes, osteopenia/osteoporosis, and glaucoma.  As appropriate for " age/gender, discussed screening for colorectal cancer, prostate cancer, breast cancer, and cervical cancer. Checklist reviewing preventive services available has been given to the patient.    Reviewed patients plan of care and provided an AVS. The Basic Care Plan (routine screening as documented in Health Maintenance) for Wesley meets the Care Plan requirement. This Care Plan has been established and reviewed with the Patient.    Counseling Resources:  ATP IV Guidelines  Pooled Cohorts Equation Calculator  Breast Cancer Risk Calculator  FRAX Risk Assessment  ICSI Preventive Guidelines  Dietary Guidelines for Americans, 2010  USDA's MyPlate  ASA Prophylaxis  Lung CA Screening    Ailyn Gutierrez MD  [facility]

## 2018-07-17 NOTE — LETTER
[facility] [address] [place]  [address]  Phone: [phone]    July 19, 2018      Wesley Art  [facility] [place] [address]              Dear Wesley,    The results from your recent visit showed Lab results are all excellent.  No more evidence of glucose intolerance.  Cholesterol ratio is excellent.  PSA is normal.        Sincerely,     Ailyn Gutierrez M.D.    Results for orders placed or performed in visit on 07/17/18   Lipid Panel ([facility])   Result Value Ref Range    Cholesterol 182 100 - 199 mg/dL    Triglycerides 117 0 - 149 mg/dL    HDL Cholesterol 56 >39 mg/dL    VLDL Cholesterol Asael 23 5 - 40 mg/dL    LDL Cholesterol Calculated 103 (H) 0 - 99 mg/dL    LDL/HDL Ratio 1.8 0.0 - 3.6 ratio    Narrative    Performed at:  01 - [facility] [address], [place], [place]  [id number]  : Heath Loo MD, Phone:  [phone]   Comp. Metabolic Panel (14) ([facility])   Result Value Ref Range    Glucose 101 (H) 65 - 99 mg/dL    Urea Nitrogen 16 8 - 27 mg/dL    Creatinine 1.15 0.76 - 1.27 mg/dL    eGFR If NonAfricn Am 66 >59 mL/min/1.73    eGFR If Africn Am 77 >59 mL/min/1.73    BUN/Creatinine Ratio 14 10 - 24    Sodium 142 134 - 144 mmol/L    Potassium 4.3 3.5 - 5.2 mmol/L    Chloride 106 96 - 106 mmol/L    Total CO2 23 20 - 29 mmol/L    Calcium 9.1 8.6 - 10.2 mg/dL    Protein Total 7.0 6.0 - 8.5 g/dL    Albumin 4.4 3.6 - 4.8 g/dL    Globulin, Total 2.6 1.5 - 4.5 g/dL    A/G Ratio 1.7 1.2 - 2.2    Bilirubin Total 0.5 0.0 - 1.2 mg/dL    Alkaline Phosphatase 71 39 - 117 IU/L    AST 25 0 - 40 IU/L    ALT 22 0 - 44 IU/L    Narrative    Performed at:  01 - [facility]  [address] [place], [place]  [id number]  : Heath Loo MD, Phone:  [phone]   PSA Serum ([facility])   Result Value Ref Range    PSA NG/ML 0.3 0.0 - 4.0 ng/mL    Narrative    Performed at:  01 - [facility] [address], [place], [place]  [id number]  : Heath  Cinda PASCUAL, Phone:  [phone]   Hemoglobin A1C ([facility])   Result Value Ref Range    Hemoglobin A1C 5.5 4.8 - 5.6 %    Narrative    Performed at:  01 - [facility] [address], [place], [place]  [id number]  : Heath Loo MD, Phone:  [phone]

## 2018-07-18 ENCOUNTER — TELEPHONE (OUTPATIENT)
Dept: FAMILY MEDICINE | Facility: CLINIC | Age: 63
End: 2018-07-18

## 2018-07-18 DIAGNOSIS — R44.2 OLFACTORY HALLUCINATION: Primary | ICD-10-CM

## 2018-07-18 LAB
ALBUMIN SERPL-MCNC: 4.4 G/DL (ref 3.6–4.8)
ALBUMIN/GLOB SERPL: 1.7 {RATIO} (ref 1.2–2.2)
ALP SERPL-CCNC: 71 IU/L (ref 39–117)
ALT SERPL-CCNC: 22 IU/L (ref 0–44)
AST SERPL-CCNC: 25 IU/L (ref 0–40)
BILIRUB SERPL-MCNC: 0.5 MG/DL (ref 0–1.2)
BUN SERPL-MCNC: 16 MG/DL (ref 8–27)
BUN/CREATININE RATIO: 14 (ref 10–24)
CALCIUM SERPL-MCNC: 9.1 MG/DL (ref 8.6–10.2)
CHLORIDE SERPLBLD-SCNC: 106 MMOL/L (ref 96–106)
CHOLEST SERPL-MCNC: 182 MG/DL (ref 100–199)
CREAT SERPL-MCNC: 1.15 MG/DL (ref 0.76–1.27)
EGFR IF AFRICN AM: 77 ML/MIN/1.73
EGFR IF NONAFRICN AM: 66 ML/MIN/1.73
GLOBULIN, TOTAL: 2.6 G/DL (ref 1.5–4.5)
GLUCOSE SERPL-MCNC: 101 MG/DL (ref 65–99)
HBA1C MFR BLD: 5.5 % (ref 4.8–5.6)
HDLC SERPL-MCNC: 56 MG/DL
LDL/HDL RATIO: 1.8 RATIO (ref 0–3.6)
LDLC SERPL CALC-MCNC: 103 MG/DL (ref 0–99)
POTASSIUM SERPL-SCNC: 4.3 MMOL/L (ref 3.5–5.2)
PROT SERPL-MCNC: 7 G/DL (ref 6–8.5)
PSA NG/ML: 0.3 NG/ML (ref 0–4)
SODIUM SERPL-SCNC: 142 MMOL/L (ref 134–144)
TOTAL CO2: 23 MMOL/L (ref 20–29)
TRIGL SERPL-MCNC: 117 MG/DL (ref 0–149)
VLDLC SERPL CALC-MCNC: 23 MG/DL (ref 5–40)

## 2018-07-18 ASSESSMENT — ANXIETY QUESTIONNAIRES: GAD7 TOTAL SCORE: 7

## 2018-07-18 ASSESSMENT — PATIENT HEALTH QUESTIONNAIRE - PHQ9: SUM OF ALL RESPONSES TO PHQ QUESTIONS 1-9: 7

## 2018-07-18 NOTE — TELEPHONE ENCOUNTER
Per Dr. Gutierrez, called to inform first step in evaluating his olfactory hallucinations that were discussed at visit yesterday, is to get MRI brain. Patient agrees and referral sent to [facility] for MRI Brain with and without contrast. [facility] to call patient and arrange.  Mona Flores RN

## 2018-07-23 ENCOUNTER — TELEPHONE (OUTPATIENT)
Dept: FAMILY MEDICINE | Facility: CLINIC | Age: 63
End: 2018-07-23

## 2018-07-23 NOTE — TELEPHONE ENCOUNTER
Called patient with result of MRI of brain.  Informed him that it did not show any abnormality that could be the cause for his smell sensation.  Patient will inform us of any new changes.

## 2018-07-27 ENCOUNTER — TELEPHONE (OUTPATIENT)
Dept: FAMILY MEDICINE | Facility: CLINIC | Age: 63
End: 2018-07-27

## 2018-07-27 DIAGNOSIS — M48.9 CERVICAL SPINE DISEASE: Primary | ICD-10-CM

## 2018-07-27 NOTE — TELEPHONE ENCOUNTER
Received call from [facility] regarding referral sent on 7/17/18.  They require MRI done within 6 months.  Per Dr. Gutierrez referral for cervical MRI sent to [facility].  Patient notified of this and will schedule MRI.  Will fax office notes and MRI report once received.  Mami Rodriguez

## 2018-08-01 ENCOUNTER — TRANSFERRED RECORDS (OUTPATIENT)
Dept: FAMILY MEDICINE | Facility: CLINIC | Age: 63
End: 2018-08-01

## 2018-08-01 DIAGNOSIS — N40.1 BENIGN PROSTATIC HYPERPLASIA WITH URINARY FREQUENCY: ICD-10-CM

## 2018-08-01 DIAGNOSIS — R35.0 BENIGN PROSTATIC HYPERPLASIA WITH URINARY FREQUENCY: ICD-10-CM

## 2018-08-01 RX ORDER — FINASTERIDE 5 MG/1
5 TABLET, FILM COATED ORAL DAILY
Qty: 90 TABLET | Refills: 3 | Status: SHIPPED | OUTPATIENT
Start: 2018-08-01

## 2018-08-03 ENCOUNTER — TELEPHONE (OUTPATIENT)
Dept: FAMILY MEDICINE | Facility: CLINIC | Age: 63
End: 2018-08-03

## 2018-08-03 DIAGNOSIS — M48.9 CERVICAL SPINE DISEASE: Primary | ICD-10-CM

## 2018-08-03 NOTE — TELEPHONE ENCOUNTER
----- Message from Ailyn Gutierrez MD sent at 8/1/2018  2:30 PM CDT -----  MRI shows essentially unchanged disc disease. Options for treatment include PT, oral steroid, epidural steroid.

## 2018-08-03 NOTE — TELEPHONE ENCOUNTER
Patient given results MRI per Dr. Gutierrez.  Patient opts for epidural steroid injection.  Referral for this is sent to [facility].  Referral along with imaging report faxed to [facility] as well.  Mami Rodriguez

## 2018-08-09 ENCOUNTER — TRANSFERRED RECORDS (OUTPATIENT)
Dept: FAMILY MEDICINE | Facility: CLINIC | Age: 63
End: 2018-08-09

## 2018-08-10 NOTE — PROGRESS NOTES
8/3/18 faxed this office note with MRI results from 8/1/18 to [facility] @ [phone]    Jewel Alberts,   [facility]  [phone]

## 2018-09-12 ENCOUNTER — TRANSFERRED RECORDS (OUTPATIENT)
Dept: FAMILY MEDICINE | Facility: CLINIC | Age: 63
End: 2018-09-12

## 2018-11-20 ENCOUNTER — SURGERY (OUTPATIENT)
Age: 63
End: 2018-11-20

## 2018-11-20 ENCOUNTER — HOSPITAL ENCOUNTER (INPATIENT)
Facility: CLINIC | Age: 63
LOS: 2 days | Discharge: HOME | DRG: 393 | End: 2018-11-22
Attending: EMERGENCY MEDICINE | Admitting: INTERNAL MEDICINE
Payer: COMMERCIAL

## 2018-11-20 DIAGNOSIS — K92.2 UPPER GI BLEED: ICD-10-CM

## 2018-11-20 DIAGNOSIS — K92.1 GASTROINTESTINAL HEMORRHAGE WITH MELENA: ICD-10-CM

## 2018-11-20 DIAGNOSIS — M54.2 NECK PAIN: Primary | ICD-10-CM

## 2018-11-20 LAB
ABO + RH BLD: NORMAL
ABO + RH BLD: NORMAL
ALBUMIN SERPL-MCNC: 3.3 G/DL (ref 3.4–5)
ALP SERPL-CCNC: 64 U/L (ref 40–150)
ALT SERPL W P-5'-P-CCNC: 28 U/L (ref 0–70)
ANION GAP SERPL CALCULATED.3IONS-SCNC: 3 MMOL/L (ref 3–14)
AST SERPL W P-5'-P-CCNC: 16 U/L (ref 0–45)
BASOPHILS # BLD AUTO: 0.1 10E9/L (ref 0–0.2)
BASOPHILS NFR BLD AUTO: 0.9 %
BILIRUB SERPL-MCNC: 0.2 MG/DL (ref 0.2–1.3)
BLD GP AB SCN SERPL QL: NORMAL
BLOOD BANK CMNT PATIENT-IMP: NORMAL
BUN SERPL-MCNC: 45 MG/DL (ref 7–30)
CALCIUM SERPL-MCNC: 8.6 MG/DL (ref 8.5–10.1)
CHLORIDE SERPL-SCNC: 110 MMOL/L (ref 94–109)
CO2 SERPL-SCNC: 28 MMOL/L (ref 20–32)
CREAT SERPL-MCNC: 1 MG/DL (ref 0.66–1.25)
DIFFERENTIAL METHOD BLD: ABNORMAL
EOSINOPHIL # BLD AUTO: 0.1 10E9/L (ref 0–0.7)
EOSINOPHIL NFR BLD AUTO: 1.5 %
ERYTHROCYTE [DISTWIDTH] IN BLOOD BY AUTOMATED COUNT: 12.2 % (ref 10–15)
GFR SERPL CREATININE-BSD FRML MDRD: 75 ML/MIN/1.7M2
GLUCOSE BLDC GLUCOMTR-MCNC: 118 MG/DL (ref 70–99)
GLUCOSE SERPL-MCNC: 109 MG/DL (ref 70–99)
HCT VFR BLD AUTO: 37.1 % (ref 40–53)
HEMOCCULT STL QL: POSITIVE
HGB BLD-MCNC: 12.2 G/DL (ref 13.3–17.7)
HGB BLD-MCNC: 8.8 G/DL (ref 13.3–17.7)
HGB BLD-MCNC: 9.9 G/DL (ref 13.3–17.7)
IMM GRANULOCYTES # BLD: 0 10E9/L (ref 0–0.4)
IMM GRANULOCYTES NFR BLD: 0.3 %
INR PPP: 1.01 (ref 0.86–1.14)
LYMPHOCYTES # BLD AUTO: 1.5 10E9/L (ref 0.8–5.3)
LYMPHOCYTES NFR BLD AUTO: 20.9 %
MCH RBC QN AUTO: 32.3 PG (ref 26.5–33)
MCHC RBC AUTO-ENTMCNC: 32.9 G/DL (ref 31.5–36.5)
MCV RBC AUTO: 98 FL (ref 78–100)
MONOCYTES # BLD AUTO: 0.5 10E9/L (ref 0–1.3)
MONOCYTES NFR BLD AUTO: 6.4 %
NEUTROPHILS # BLD AUTO: 5.2 10E9/L (ref 1.6–8.3)
NEUTROPHILS NFR BLD AUTO: 70 %
NRBC # BLD AUTO: 0 10*3/UL
NRBC BLD AUTO-RTO: 0 /100
PLATELET # BLD AUTO: 294 10E9/L (ref 150–450)
POTASSIUM SERPL-SCNC: 4.3 MMOL/L (ref 3.4–5.3)
PROT SERPL-MCNC: 6.6 G/DL (ref 6.8–8.8)
RBC # BLD AUTO: 3.78 10E12/L (ref 4.4–5.9)
SODIUM SERPL-SCNC: 141 MMOL/L (ref 133–144)
SPECIMEN EXP DATE BLD: NORMAL
UPPER GI ENDOSCOPY: NORMAL
WBC # BLD AUTO: 7.4 10E9/L (ref 4–11)

## 2018-11-20 PROCEDURE — G0500 MOD SEDAT ENDO SERVICE >5YRS: HCPCS | Performed by: INTERNAL MEDICINE

## 2018-11-20 PROCEDURE — 85025 COMPLETE CBC W/AUTO DIFF WBC: CPT | Performed by: EMERGENCY MEDICINE

## 2018-11-20 PROCEDURE — 83550 IRON BINDING TEST: CPT | Performed by: INTERNAL MEDICINE

## 2018-11-20 PROCEDURE — 86901 BLOOD TYPING SEROLOGIC RH(D): CPT | Performed by: EMERGENCY MEDICINE

## 2018-11-20 PROCEDURE — 20000003 ZZH R&B ICU

## 2018-11-20 PROCEDURE — 82728 ASSAY OF FERRITIN: CPT | Performed by: INTERNAL MEDICINE

## 2018-11-20 PROCEDURE — 80053 COMPREHEN METABOLIC PANEL: CPT | Performed by: EMERGENCY MEDICINE

## 2018-11-20 PROCEDURE — 96374 THER/PROPH/DIAG INJ IV PUSH: CPT

## 2018-11-20 PROCEDURE — 25000128 H RX IP 250 OP 636: Performed by: INTERNAL MEDICINE

## 2018-11-20 PROCEDURE — 83540 ASSAY OF IRON: CPT | Performed by: INTERNAL MEDICINE

## 2018-11-20 PROCEDURE — 99207 ZZC CDG-CHARGE REQUIRED MANUAL ENTRY: CPT | Performed by: INTERNAL MEDICINE

## 2018-11-20 PROCEDURE — 25000128 H RX IP 250 OP 636: Performed by: PHYSICIAN ASSISTANT

## 2018-11-20 PROCEDURE — 0W3P8ZZ CONTROL BLEEDING IN GASTROINTESTINAL TRACT, VIA NATURAL OR ARTIFICIAL OPENING ENDOSCOPIC: ICD-10-PCS | Performed by: INTERNAL MEDICINE

## 2018-11-20 PROCEDURE — 85610 PROTHROMBIN TIME: CPT | Performed by: EMERGENCY MEDICINE

## 2018-11-20 PROCEDURE — 99207 ZZC APP CREDIT; MD BILLING SHARED VISIT: CPT | Performed by: PHYSICIAN ASSISTANT

## 2018-11-20 PROCEDURE — 82272 OCCULT BLD FECES 1-3 TESTS: CPT | Performed by: EMERGENCY MEDICINE

## 2018-11-20 PROCEDURE — 25000132 ZZH RX MED GY IP 250 OP 250 PS 637: Performed by: PHYSICIAN ASSISTANT

## 2018-11-20 PROCEDURE — 36415 COLL VENOUS BLD VENIPUNCTURE: CPT | Performed by: INTERNAL MEDICINE

## 2018-11-20 PROCEDURE — 25800025 ZZH RX 258: Performed by: INTERNAL MEDICINE

## 2018-11-20 PROCEDURE — 43255 EGD CONTROL BLEEDING ANY: CPT | Performed by: INTERNAL MEDICINE

## 2018-11-20 PROCEDURE — 00000146 ZZHCL STATISTIC GLUCOSE BY METER IP

## 2018-11-20 PROCEDURE — C9113 INJ PANTOPRAZOLE SODIUM, VIA: HCPCS | Performed by: INTERNAL MEDICINE

## 2018-11-20 PROCEDURE — 99223 1ST HOSP IP/OBS HIGH 75: CPT | Mod: AI | Performed by: INTERNAL MEDICINE

## 2018-11-20 PROCEDURE — 86900 BLOOD TYPING SEROLOGIC ABO: CPT | Performed by: EMERGENCY MEDICINE

## 2018-11-20 PROCEDURE — 3E0G8GC INTRODUCTION OF OTHER THERAPEUTIC SUBSTANCE INTO UPPER GI, VIA NATURAL OR ARTIFICIAL OPENING ENDOSCOPIC: ICD-10-PCS | Performed by: INTERNAL MEDICINE

## 2018-11-20 PROCEDURE — 96361 HYDRATE IV INFUSION ADD-ON: CPT

## 2018-11-20 PROCEDURE — 43236 UPPR GI SCOPE W/SUBMUC INJ: CPT | Performed by: INTERNAL MEDICINE

## 2018-11-20 PROCEDURE — 85018 HEMOGLOBIN: CPT | Performed by: INTERNAL MEDICINE

## 2018-11-20 PROCEDURE — 99285 EMERGENCY DEPT VISIT HI MDM: CPT

## 2018-11-20 PROCEDURE — 25000125 ZZHC RX 250: Performed by: INTERNAL MEDICINE

## 2018-11-20 PROCEDURE — 86850 RBC ANTIBODY SCREEN: CPT | Performed by: EMERGENCY MEDICINE

## 2018-11-20 PROCEDURE — C9113 INJ PANTOPRAZOLE SODIUM, VIA: HCPCS | Performed by: EMERGENCY MEDICINE

## 2018-11-20 PROCEDURE — 25000128 H RX IP 250 OP 636: Performed by: EMERGENCY MEDICINE

## 2018-11-20 PROCEDURE — G0378 HOSPITAL OBSERVATION PER HR: HCPCS

## 2018-11-20 RX ORDER — ACETAMINOPHEN 325 MG/1
650 TABLET ORAL EVERY 4 HOURS PRN
Status: DISCONTINUED | OUTPATIENT
Start: 2018-11-20 | End: 2018-11-22 | Stop reason: HOSPADM

## 2018-11-20 RX ORDER — BUPROPION HYDROCHLORIDE 100 MG/1
100 TABLET, EXTENDED RELEASE ORAL 2 TIMES DAILY
Status: DISCONTINUED | OUTPATIENT
Start: 2018-11-20 | End: 2018-11-22 | Stop reason: HOSPADM

## 2018-11-20 RX ORDER — SERTRALINE HYDROCHLORIDE 100 MG/1
100 TABLET, FILM COATED ORAL DAILY
Status: DISCONTINUED | OUTPATIENT
Start: 2018-11-21 | End: 2018-11-22 | Stop reason: HOSPADM

## 2018-11-20 RX ORDER — DOXAZOSIN 4 MG/1
4 TABLET ORAL 2 TIMES DAILY
Status: DISCONTINUED | OUTPATIENT
Start: 2018-11-20 | End: 2018-11-22 | Stop reason: HOSPADM

## 2018-11-20 RX ORDER — ONDANSETRON 2 MG/ML
4 INJECTION INTRAMUSCULAR; INTRAVENOUS EVERY 6 HOURS PRN
Status: DISCONTINUED | OUTPATIENT
Start: 2018-11-20 | End: 2018-11-22 | Stop reason: HOSPADM

## 2018-11-20 RX ORDER — NALOXONE HYDROCHLORIDE 0.4 MG/ML
.1-.4 INJECTION, SOLUTION INTRAMUSCULAR; INTRAVENOUS; SUBCUTANEOUS
Status: DISCONTINUED | OUTPATIENT
Start: 2018-11-20 | End: 2018-11-22 | Stop reason: HOSPADM

## 2018-11-20 RX ORDER — FINASTERIDE 5 MG/1
5 TABLET, FILM COATED ORAL DAILY
Status: DISCONTINUED | OUTPATIENT
Start: 2018-11-21 | End: 2018-11-22 | Stop reason: HOSPADM

## 2018-11-20 RX ORDER — ONDANSETRON 4 MG/1
4 TABLET, ORALLY DISINTEGRATING ORAL EVERY 6 HOURS PRN
Status: DISCONTINUED | OUTPATIENT
Start: 2018-11-20 | End: 2018-11-22 | Stop reason: HOSPADM

## 2018-11-20 RX ORDER — SODIUM CHLORIDE 9 MG/ML
INJECTION, SOLUTION INTRAVENOUS CONTINUOUS
Status: DISCONTINUED | OUTPATIENT
Start: 2018-11-20 | End: 2018-11-22 | Stop reason: HOSPADM

## 2018-11-20 RX ORDER — ACYCLOVIR 200 MG/1
CAPSULE ORAL PRN
Status: DISCONTINUED | OUTPATIENT
Start: 2018-11-20 | End: 2018-11-20 | Stop reason: HOSPADM

## 2018-11-20 RX ORDER — SODIUM CHLORIDE 9 MG/ML
INJECTION, SOLUTION INTRAVENOUS CONTINUOUS
Status: DISCONTINUED | OUTPATIENT
Start: 2018-11-20 | End: 2018-11-20

## 2018-11-20 RX ORDER — DOXAZOSIN 4 MG/1
4 TABLET ORAL 2 TIMES DAILY
COMMUNITY

## 2018-11-20 RX ORDER — FENTANYL CITRATE 50 UG/ML
INJECTION, SOLUTION INTRAMUSCULAR; INTRAVENOUS PRN
Status: DISCONTINUED | OUTPATIENT
Start: 2018-11-20 | End: 2018-11-20 | Stop reason: HOSPADM

## 2018-11-20 RX ORDER — LIDOCAINE 40 MG/G
CREAM TOPICAL
Status: DISCONTINUED | OUTPATIENT
Start: 2018-11-20 | End: 2018-11-22 | Stop reason: HOSPADM

## 2018-11-20 RX ADMIN — SODIUM CHLORIDE 1000 ML: 9 INJECTION, SOLUTION INTRAVENOUS at 08:40

## 2018-11-20 RX ADMIN — SODIUM CHLORIDE 3 ML: 9 INJECTION, SOLUTION INTRAMUSCULAR; INTRAVENOUS; SUBCUTANEOUS at 14:18

## 2018-11-20 RX ADMIN — ACETAMINOPHEN 650 MG: 325 TABLET, FILM COATED ORAL at 21:26

## 2018-11-20 RX ADMIN — FENTANYL CITRATE 50 MCG: 50 INJECTION, SOLUTION INTRAMUSCULAR; INTRAVENOUS at 14:20

## 2018-11-20 RX ADMIN — BUPROPION HYDROCHLORIDE 100 MG: 100 TABLET, FILM COATED, EXTENDED RELEASE ORAL at 23:02

## 2018-11-20 RX ADMIN — SODIUM CHLORIDE: 9 INJECTION, SOLUTION INTRAVENOUS at 11:06

## 2018-11-20 RX ADMIN — SODIUM CHLORIDE 8 MG/HR: 9 INJECTION, SOLUTION INTRAVENOUS at 17:01

## 2018-11-20 RX ADMIN — DOXAZOSIN 4 MG: 4 TABLET ORAL at 21:17

## 2018-11-20 RX ADMIN — FENTANYL CITRATE 50 MCG: 50 INJECTION, SOLUTION INTRAMUSCULAR; INTRAVENOUS at 14:18

## 2018-11-20 RX ADMIN — SODIUM CHLORIDE 3 ML: 9 INJECTION, SOLUTION INTRAMUSCULAR; INTRAVENOUS; SUBCUTANEOUS at 14:20

## 2018-11-20 RX ADMIN — Medication 1 MG: at 21:26

## 2018-11-20 RX ADMIN — SODIUM CHLORIDE 3 ML: 9 INJECTION, SOLUTION INTRAMUSCULAR; INTRAVENOUS; SUBCUTANEOUS at 14:29

## 2018-11-20 RX ADMIN — TOPICAL ANESTHETIC 1 EACH: 200 SPRAY DENTAL; PERIODONTAL at 14:18

## 2018-11-20 RX ADMIN — MIDAZOLAM 1 MG: 1 INJECTION INTRAMUSCULAR; INTRAVENOUS at 14:18

## 2018-11-20 RX ADMIN — MIDAZOLAM 1 MG: 1 INJECTION INTRAMUSCULAR; INTRAVENOUS at 14:20

## 2018-11-20 RX ADMIN — PANTOPRAZOLE SODIUM 80 MG: 40 INJECTION, POWDER, FOR SOLUTION INTRAVENOUS at 08:59

## 2018-11-20 RX ADMIN — SODIUM CHLORIDE, PRESERVATIVE FREE: 5 INJECTION INTRAVENOUS at 16:10

## 2018-11-20 RX ADMIN — MIDAZOLAM 1 MG: 1 INJECTION INTRAMUSCULAR; INTRAVENOUS at 14:29

## 2018-11-20 ASSESSMENT — ACTIVITIES OF DAILY LIVING (ADL)
ADLS_ACUITY_SCORE: 11
ADLS_ACUITY_SCORE: 11

## 2018-11-20 ASSESSMENT — ENCOUNTER SYMPTOMS
DIZZINESS: 0
FEVER: 0
ABDOMINAL PAIN: 0
BLOOD IN STOOL: 1
CHILLS: 0
SHORTNESS OF BREATH: 0
VOMITING: 0

## 2018-11-20 NOTE — ED NOTES
[facility]  ED Nurse Handoff Report    Wesley Art is a 63 year old male   ED Chief complaint: Rectal Bleeding  . ED Diagnosis:   Final diagnoses:   Upper GI bleed   Gastrointestinal hemorrhage with melena     Allergies: No Known Allergies    Code Status: Full Code  Activity level - Baseline/Home:  Independent. Activity Level - Current:   Stand with Assist. Lift room needed: No. Bariatric: No   Needed: No   Isolation: No. Infection: Not Applicable.     Vital Signs:   Vitals:    11/20/18 0915 11/20/18 0930 11/20/18 0945 11/20/18 1000   BP: 128/75 124/72 112/71    Pulse:       Resp:       Temp:       TempSrc:       SpO2: 93% 93% 95% 95%   Weight:       Height:           Cardiac Rhythm:  ,      Pain level: 0-10 Pain Scale: 4  Patient confused: No. Patient Falls Risk: Yes.   Elimination Status: Has voided   Patient Report - Initial Complaint: Rectal bleeding. Pt presents with feeling weak, lightheaded, and passing black stools times two in the night. Denies abdominal pain or vomiting. Reports started feeling tired yesterday. Has taken some ibuprofen the last few days for chronic neck pain. Skin pink warm and dry.  . Focused Assessment: Gastrointestinal - GI WDL:  WDL except; all Abdominal Appearance: nondistended All Quadrants Bowel Sounds: audible and active in all quadrants All Quadrants Palpation: soft/nontender Last Bowel Movement: 11/20/18 Stool Color: black   Tests Performed:   Labs Ordered and Resulted from Time of ED Arrival Up to the Time of Departure from the ED   CBC WITH PLATELETS DIFFERENTIAL - Abnormal; Notable for the following:        Result Value    RBC Count 3.78 (*)     Hemoglobin 12.2 (*)     Hematocrit 37.1 (*)     All other components within normal limits   COMPREHENSIVE METABOLIC PANEL - Abnormal; Notable for the following:     Chloride 110 (*)     Glucose 109 (*)     Urea Nitrogen 45 (*)     Albumin 3.3 (*)     Protein Total 6.6 (*)     All other components  within normal limits   OCCULT BLOOD STOOL - Abnormal; Notable for the following:     Occult Blood Positive (*)     All other components within normal limits   INR   IV ACCESS   ABO/RH TYPE AND SCREEN   . Abnormal Results: see above.   Treatments provided: Fluids, protonix.  Family Comments: Pt has his phone to contact his family as needed.  OBS brochure/video discussed/provided to patient:  N/A  ED Medications:   Medications   0.9% sodium chloride BOLUS (0 mLs Intravenous Stopped 11/20/18 1001)   pantoprazole (PROTONIX) 40 mg IV push injection (80 mg Intravenous Given 11/20/18 0859)     Drips infusing:  No  For the majority of the shift, the patient's behavior Green. Interventions performed were Labs, fluids, protonix.     Severe Sepsis OR Septic Shock Diagnosis Present: No      ED Nurse Name/Phone Number: Brooks Cason,   10:14 AM  RECEIVING UNIT ED HANDOFF REVIEW    Above ED Nurse Handoff Report was reviewed: Yes  Reviewed by: Gabbie Simons on November 20, 2018 at 12:03 PM

## 2018-11-20 NOTE — ED PROVIDER NOTES
History     Chief Complaint:  Rectal Bleeding    The history is provided by the patient.      Wesley Art is a 63 year old male type II diabetic, not anticoagulated, s/p gastric bypass, with a history of bleeding ulcers (in 2008) who presents for evaluation of rectal bleeding. The patient reports that he has been taking ibuprofen to treat his chronic neck pain; he takes 3 tablets per dose and no more than 1 dose per day. The patient notes that he began to feel somewhat tired yesterday but was otherwise feeling at baseline. This morning, around 3:00 AM, he first noticed an episode of black, liquid stool. He then had one more episode with this stool consistency around 6:00 AM this morning. Denies noticing any bright red blood from his rectum. Patient denies vomiting, abdominal pain, dizziness, lightheadedness, chest pain, shortness of breath, fevers, chills, changes in urination, or other complaint.    Allergies:  No known drug allergies     Medications:    Vitamin C, D  Boron  Wellbutrin  Cardura  Proscar  Multivitamin   Prilosec  Zoloft     Past Medical History:    Morbid obesity  LU  Benign nodular prostatic hyperplasia w/ lower UTI symptoms  Impaired glucose intolerance  Primary osteoarthritis involving multiple joints  Major depression in partial remission   EtOH abuse  Benign essential hypertension  Type II diabetes   Bleeding ulcers    Past Surgical History:    Appendectomy  Laparoscopic cholecystectomy  Vasectomy  Laparoscopic gastroplasty revision    Family History:    CAD    Social History:  Presents with friend   Tobacco use: Never smoker   Alcohol use: No (sober since 11/11/2007)  PCP: Ailyn Gutierrez    Marital Status:       Review of Systems   Constitutional: Negative for chills and fever.   Respiratory: Negative for shortness of breath.    Cardiovascular: Negative for chest pain.   Gastrointestinal: Positive for blood in stool. Negative for abdominal pain and vomiting.  "  Neurological: Negative for dizziness.   All other systems reviewed and are negative.    Physical Exam     Patient Vitals for the past 24 hrs:   BP Temp Temp src Pulse Resp SpO2 Height Weight   11/20/18 1030 117/73 - - - - - - -   11/20/18 1017 115/73 - - - 19 98 % - -   11/20/18 1015 (!) 88/68 - - - - 98 % - -   11/20/18 1000 122/76 - - - - 95 % - -   11/20/18 0945 112/71 - - - - 95 % - -   11/20/18 0930 124/72 - - - - 93 % - -   11/20/18 0915 128/75 - - - - 93 % - -   11/20/18 0900 131/85 - - - - - - -   11/20/18 0845 142/81 - - - - - - -   11/20/18 0824 - 98.4  F (36.9  C) Oral - - - - -   11/20/18 0821 149/90 - - 103 20 96 % 1.88 m (6' 2\") 126.6 kg (279 lb 1.6 oz)        Physical Exam  Constitutional: Well developed, nontox appearance  Head: Atraumatic.   Mouth/Throat: Oropharynx is clear and moist.   Neck:  no stridor  Eyes: no scleral icterus  Cardiovascular: RRR, 2+ bilat radial pulses  Pulmonary/Chest: nml resp effort, Clear BS bilat  Abdominal: ND, +BS, soft, NT, no rebound or guarding   Rectal: dried black stool perianally, no masses on SAUNDRA, scant amount of melena on glove  Ext: Warm, well perfused, no edema  Neurological: A&O, symmetric facies, moves ext x4  Skin: Skin is warm and dry.   Psychiatric: Behavior is normal. Thought content normal.   Nursing note and vitals reviewed.    Emergency Department Course     Laboratory:  CBC: HGB 12.2 (L) o/w WNL (WBC 7.4, )   CMP: Cl 110 (H),  (H), BUN 45 (H), Albumin 3.3 (L), Protein total 6.6 (L) o/w WNL (Creatinine 1.00)   INR: 1.01  ABO: O, Rh: Positive    Occult blood stool: Positive      Interventions:  0840: NS 1L IV Bolus     0859: Protonix 80 mg IV push    Emergency Department Course:  Past medical records, nursing notes, and vitals reviewed.  0825: I performed an exam of the patient and obtained history, as documented above. Rectal exam performed at this time.    IV inserted and blood drawn. Above interventions provided. Blood was sent to " the lab for further testing, results above.     Findings and plan explained to the Patient who consents to admission.     1048: Discussed the patient with Dr. Cailin Gomez, who will admit the patient to a med bed for further monitoring, evaluation, and treatment.      Impression & Plan      Medical Decision Makin-year-old male presenting with black stools     Differential diagnosis includes upper GI bleed, bleeding ulcer, gastritis, GI bleed NOS, anemia.  The patient reports feeling somewhat lightheaded.  Physical exam is significant for melanoma.  Labs ordered as noted above and significant for mild anemia, positive stool occult blood.  IV fluids given as noted above and second IV placed.  Given the patient's history of gastric bypass, bleeding gastric ulcers and melena on today's exam, I think would be reasonable to admit the patient for serial hemoglobin levels and possible endoscopy should his symptoms persist.  Patient was counseled on results, diagnosis and disposition.  He is understanding and agreeable to plan.  He was subsequently admitted to hospitalist service in stable condition.    Diagnosis:    ICD-10-CM   1. Upper GI bleed K92.2   2. Gastrointestinal hemorrhage with melena K92.1       Disposition:  Admitted to hospitalist service.    Scribe Disclosure:  IVANNA, Mono Raza, am serving as a scribe at 8:19 AM on 2018 to document services personally performed by Ken Patrick MD based on my observations and the provider's statements to me.   2018   [facility] EMERGENCY DEPARTMENT     Ken Patrick MD  18 [id number]

## 2018-11-20 NOTE — ED NOTES
Pt ambulated to the bathroom. Pt initially dizzy and lightheaded upon standing up at the bedside. Pt sat on the edge of the bed for a minute or two before ambulating. While ambulating, pt's gait is steady and he walks unassisted. Pt denies dizziness nor lightheadedness upon ambulating.

## 2018-11-20 NOTE — IP AVS SNAPSHOT
MRN:[id number]                      After Visit Summary   11/20/2018    Wesley Art    MRN: [id number]           Thank you!     Thank you for choosing [facility] for your care. Our goal is always to provide you with excellent care. Hearing back from our patients is one way we can continue to improve our services. Please take a few minutes to complete the written survey that you may receive in the mail after you visit. If you would like to speak to someone directly about your visit please contact Patient Relations at [phone]. Thank you!          Patient Information     Date Of Birth          1955        Designated Caregiver       Most Recent Value    Caregiver    Will someone help with your care after discharge? no    Name of designated caregiver N/A      About your hospital stay     You were admitted on:  November 20, 2018 You last received care in the:  [facility]    You were discharged on:  November 22, 2018        Reason for your hospital stay       Bleeding from the connection at the gastric bypass.                  Who to Call     For medical emergencies, please call 911.  For non-urgent questions about your medical care, please call your primary care provider or clinic, [phone]  For questions related to your surgery, please call your surgery clinic        Attending Provider     Provider Specialty    Ken Patrick MD Emergency Medicine    [facility], Víctor Mathis MD Internal Medicine    Suresh Orantes MD Internal Medicine       Primary Care Provider Office Phone # Fax #    Ailyn Gutierrez [phone]      After Care Instructions     Activity       Your activity upon discharge: activity as tolerated            Diet       Follow this diet upon discharge: Low fiber.                  Follow-up Appointments     Follow-up and recommended labs and tests        Follow up with primary care provider, Ailyn Gutierrez,  "within 1-2 weeks to recheck the hemoglobin.                  Pending Results     No orders found from 2018 to 2018.            Statement of Approval     Ordered          18 [id number]  I have reviewed and agree with all the recommendations and orders detailed in this document.  EFFECTIVE NOW     Approved and electronically signed by:  Suresh Orantes MD             Admission Information     Date & Time Provider Department Dept. Phone    2018 Suresh Orantes MD [facility] [phone]      Your Vitals Were     Blood Pressure Pulse Temperature Respirations Height Weight    117/66 (BP Location: Right arm) 65 97.6  F (36.4  C) (Oral) 18 1.88 m (6' 2\") 125.9 kg (277 lb 9 oz)    Pulse Oximetry BMI (Body Mass Index)                96% 35.64 kg/m2          MyChart Information     [employer] lets you send messages to your doctor, view your test results, renew your prescriptions, schedule appointments and more. To sign up, go to [email or web address] . Click on \"Log in\" on the left side of the screen, which will take you to the Welcome page. Then click on \"Sign up Now\" on the right side of the page.     You will be asked to enter the access code listed below, as well as some personal information. Please follow the directions to create your username and password.     Your access code is: [email or web address]  Expires: 2019  2:32 PM     Your access code will  in 90 days. If you need help or a new code, please call your [place] clinic or [phone].        Care EveryWhere ID     This is your Care EveryWhere ID. This could be used by other organizations to access your [facility] medical records  [id number]        Equal Access to Services     CHRISTA HAMILTON : Kelly Doshi, leo colmenares, raymond graves, tye reyes. So [facility] [phone].    ATENCIÓN: Si habla español, tiene a salinas disposición servicios gratuitos de asistencia " lingüísticaCamille Velazquez al [phone].    We comply with applicable federal civil rights laws and [place] laws. We do not discriminate on the basis of race, color, national origin, age, disability, sex, sexual orientation, or gender identity.               Review of your medicines      START taking        Dose / Directions    cyclobenzaprine 5 MG tablet   Commonly known as:  FLEXERIL   Used for:  Neck pain        Dose:  2.5-5 mg   Take 0.5-1 tablets (2.5-5 mg) by mouth 3 times daily as needed for muscle spasms   Quantity:  20 tablet   Refills:  0       omeprazole 40 MG capsule   Commonly known as:  priLOSEC   Used for:  Upper GI bleed        Dose:  40 mg   Take 1 capsule (40 mg) by mouth 2 times daily (before meals) Take 30-60 minutes before a meal.   Quantity:  30 capsule   Refills:  1         CONTINUE these medicines which have NOT CHANGED        Dose / Directions    B-12 (Methylcobalamin) 1000 MCG Subl   Used for:  H/O gastric bypass, B12 deficiency        Dose:  1 tablet   Place 1 tablet under the tongue every other day   Quantity:  90 tablet   Refills:  1       Boron 3 MG Caps        Dose:  3 mg   Take 3 mg by mouth 2 times daily   Refills:  0       buPROPion 100 MG 12 hr tablet   Commonly known as:  WELLBUTRIN SR   Used for:  Recurrent major depressive disorder, in partial remission (H)        TAKE 1 TABLET TWICE A DAY   Quantity:  180 tablet   Refills:  3       doxazosin 4 MG tablet   Commonly known as:  CARDURA        Dose:  4 mg   Take 4 mg by mouth 2 times daily   Refills:  0       finasteride 5 MG tablet   Commonly known as:  PROSCAR   Used for:  Benign prostatic hyperplasia with urinary frequency        Dose:  5 mg   Take 1 tablet (5 mg) by mouth daily   Quantity:  90 tablet   Refills:  3       MULTI COMPLETE PO   Indication:  with Iron        Dose:  1 tablet   Take 1 tablet by mouth daily   Refills:  0       sertraline 100 MG tablet   Commonly known as:  ZOLOFT   Used for:  Recurrent major depressive  disorder, in partial remission (H), LU (generalized anxiety disorder)        Dose:  100 mg   Take 1 tablet (100 mg) by mouth daily   Quantity:  90 tablet   Refills:  3       VITAMIN C PO        Dose:  1000 mg   Take 1,000 mg by mouth daily   Refills:  0       vitamin D3 2000 units tablet   Commonly known as:  CHOLECALCIFEROL   Used for:  Low vitamin D level        Dose:  2000 Units   Take 2,000 Units by mouth 2 times daily   Quantity:  100 tablet   Refills:  3            Where to get your medicines      Some of these will need a paper prescription and others can be bought over the counter. Ask your nurse if you have questions.     Bring a paper prescription for each of these medications     cyclobenzaprine 5 MG tablet       You don't need a prescription for these medications     omeprazole 40 MG capsule                Protect others around you: Learn how to safely use, store and throw away your medicines at www.disposemymeds.org.             Medication List: This is a list of all your medications and when to take them. Check marks below indicate your daily home schedule. Keep this list as a reference.      Medications           Morning Afternoon Evening Bedtime As Needed    B-12 (Methylcobalamin) 1000 MCG Subl   Place 1 tablet under the tongue every other day                                   Boron 3 MG Caps   Take 3 mg by mouth 2 times daily                                      buPROPion 100 MG 12 hr tablet   Commonly known as:  WELLBUTRIN SR   TAKE 1 TABLET TWICE A DAY   Last time this was given:  100 mg on 11/22/2018  8:35 AM                                      cyclobenzaprine 5 MG tablet   Commonly known as:  FLEXERIL   Take 0.5-1 tablets (2.5-5 mg) by mouth 3 times daily as needed for muscle spasms                                   doxazosin 4 MG tablet   Commonly known as:  CARDURA   Take 4 mg by mouth 2 times daily   Last time this was given:  4 mg on 11/22/2018  8:35 AM                                       finasteride 5 MG tablet   Commonly known as:  PROSCAR   Take 1 tablet (5 mg) by mouth daily   Last time this was given:  5 mg on 11/22/2018  8:35 AM                                   MULTI COMPLETE PO   Take 1 tablet by mouth daily                                   omeprazole 40 MG capsule   Commonly known as:  priLOSEC   Take 1 capsule (40 mg) by mouth 2 times daily (before meals) Take 30-60 minutes before a meal.                                      sertraline 100 MG tablet   Commonly known as:  ZOLOFT   Take 1 tablet (100 mg) by mouth daily   Last time this was given:  100 mg on 11/22/2018  8:35 AM                                   VITAMIN C PO   Take 1,000 mg by mouth daily                                   vitamin D3 2000 units tablet   Commonly known as:  CHOLECALCIFEROL   Take 2,000 Units by mouth 2 times daily

## 2018-11-20 NOTE — PHARMACY-ADMISSION MEDICATION HISTORY
Admission medication history interview status for this patient is complete. See [facility] admission navigator for allergy information, prior to admission medications and immunization status.     Medication history interview source(s):Patient  Medication history resources (including written lists, pill bottles, clinic record):None  Primary pharmacy: [facility] - [address]    Changes made to PTA medication list:  Added: none  Deleted: none  Changed: doxazosin (6 mg at bedtime --> 4 mg BID)    Actions taken by pharmacist (provider contacted, etc):None   Additional medication history information:None  Medication reconciliation/reorder completed by provider prior to medication history? No    Prior to Admission medications    Medication Sig Last Dose Taking? Auth Provider   Ascorbic Acid (VITAMIN C PO) Take 1,000 mg by mouth daily  11/20/2018 at am Yes Reported, Patient   B-12, Methylcobalamin, 1000 MCG SUBL Place 1 tablet under the tongue every other day 11/20/2018 at am Yes Ailyn Gutierrez MD   [place] 3 MG CAPS Take 3 mg by mouth 2 times daily 11/20/2018 at x 1 Yes Reported, Patient   buPROPion (WELLBUTRIN SR) 100 MG 12 hr tablet TAKE 1 TABLET TWICE A DAY 11/20/2018 at x 1 Yes Ailyn Gutierrez MD   Cholecalciferol (VITAMIN D) 2000 UNITS tablet Take 2,000 Units by mouth 2 times daily 11/20/2018 at x 1 Yes Ailyn Gutierrez MD   doxazosin (CARDURA) 4 MG tablet Take 4 mg by mouth 2 times daily 11/20/2018 at x 1 Yes Unknown, Entered By History   finasteride (PROSCAR) 5 MG tablet Take 1 tablet (5 mg) by mouth daily 11/20/2018 at am Yes Ailyn Gutierrez MD   Multiple Vitamins-Minerals (MULTI COMPLETE PO) Take 1 tablet by mouth daily  11/20/2018 at am Yes Reported, Patient   sertraline (ZOLOFT) 100 MG tablet Take 1 tablet (100 mg) by mouth daily 11/20/2018 at am Yes Ailyn Gutierrez MD

## 2018-11-20 NOTE — ED NOTES
Pt up to BR. Reports small soft dark stool. Pt became lightheaded, pale with activity. BP 88/68, . Upon laying down symptoms resolved.

## 2018-11-20 NOTE — CONSULTS
GASTROENTEROLOGY CONSULTATION     Wesley Art  [address] [place] [address]  63 year old male    Admission Date/Time: 11/20/2018  Primary Care Provider: Ailyn Gutierrez    We were asked to see the patient in consultation by Dr. Chapman for evaluation of melena.        HPI:  Wesley Art is a 63 year old male who has felt woozy and dizzy for the past 1-2 weeks. Overnight last night he developed very dark appearing stool.      He has a history of gastric bypass surgery in 2005 and DM2.  He has a history of gastric ulcer in 2008. The patient had been taking PPI regularly until a few weeks ago. Also, he has been using NSAIDs for neck pain.    He does not smoke or drink alcohol. Denies abdominal pain.    ROS: A comprehensive ten point review of systems was negative aside from those in mentioned in the HPI.      MEDICATIONS:   No current facility-administered medications on file prior to encounter.   Current Outpatient Prescriptions on File Prior to Encounter:  Ascorbic Acid (VITAMIN C PO) Take 1,000 mg by mouth daily    B-12, Methylcobalamin, 1000 MCG SUBL Place 1 tablet under the tongue every other day   Boron 3 MG CAPS Take 3 mg by mouth 2 times daily   buPROPion (WELLBUTRIN SR) 100 MG 12 hr tablet TAKE 1 TABLET TWICE A DAY   Cholecalciferol (VITAMIN D) 2000 UNITS tablet Take 2,000 Units by mouth 2 times daily   finasteride (PROSCAR) 5 MG tablet Take 1 tablet (5 mg) by mouth daily   Multiple Vitamins-Minerals (MULTI COMPLETE PO) Take 1 tablet by mouth daily    sertraline (ZOLOFT) 100 MG tablet Take 1 tablet (100 mg) by mouth daily       ALLERGIES: No Known Allergies    Past Medical History:   Diagnosis Date     Alcohol abuse, unspecified     PMH alcohol abuse     Depressive disorder, not elsewhere classified      Duodenal ulcer      Essential hypertension, benign     resolved     Hematuria      Obesity, unspecified      Osteoarthritis 6/12/2015     Type II or unspecified type diabetes  "mellitus without mention of complication, not stated as uncontrolled     resolved       Past Surgical History:   Procedure Laterality Date     APPENDECTOMY  child     CHOLECYSTECTOMY, LAPOROSCOPIC  1998    Cholecystectomy, Laparoscopic     LAPAROSCOPIC REVISION GASTROPLASTY TO MACIEJ-EN-Y  2006     VASECTOMY  1980         SOCIAL HISTORY:  Social History   Substance Use Topics     Smoking status: Never Smoker     Smokeless tobacco: Never Used     Alcohol use No      Comment: sober since 11/11/2007       FAMILY HISTORY:  Non contributory    PHYSICAL EXAM:   /80  Pulse 103  Temp 97.1  F (36.2  C) (Oral)  Resp 16  Ht 1.88 m (6' 2\")  Wt 125.9 kg (277 lb 8 oz)  SpO2 96%  BMI 35.63 kg/m2    Constitutional: NAD, comfortable  Cardiovascular: RRR, normal S1 and S2, no r/c/g/m  Respiratory: CTAB  Psychiatric: mentation appears normal and affect normal/bright  Head: Normocephalic. Atraumatic.    Neck: thick neck, trachea midline  Eyes:  PERRL, no icterus  ENT: hearing adequate, pharynx normal without erythema or exudate  Abdomen:   Auscultation: obese  Appearance: normal  Palpation: normal  NEURO: grossly negative  SKIN: pale            ADDITIONAL COMMENTS:   I reviewed the patient's new clinical lab test results.   Recent Labs   Lab Test  11/20/18   0838  06/12/15   0925  04/07/14   0927   WBC  7.4  5.0  5.9   HGB  12.2*  14.2  13.7   MCV  98  94.6  92.8   PLT  294  251  269   INR  1.01   --    --      Recent Labs   Lab Test  11/20/18   0838  07/17/18   1006  07/12/17   1010   NA  141  142  143   POTASSIUM  4.3  4.3  4.6   CHLORIDE  110*  106  104   CO2  28   --    --    BUN  45*  16  14  15  16   CR  1.00  1.15  0.92   ANIONGAP  3   --    --    SHAREE  8.6  9.1  9.5   GLC  109*  101*  100*     Recent Labs   Lab Test  11/20/18 0838  07/17/18   1006  07/12/17   1010   ALBUMIN  3.3*  4.4  4.1   BILITOTAL  0.2  0.5  0.5   ALT  28  22  23   AST  16  25  27   ALKPHOS  64  71  73             .    CONSULTATION ASSESSMENT " AND PLAN:    Active Problems:    Melena    Assessment: 63 year old man with history of bradley en y gastric bypass with gastric ulcer 2008.  He has been using NSAIDs for neck pain and has been off PPI medication for several weeks. Now with melena and dizziness.  Hemoglobin relatively stable, but concerning for upper gi blood loss given NSAID use after bypass, raising risk of peptic ulcer.    Plan:   - EGD today          Martha Isbell MD  [facility]  Office:  [phone]  Cell/Pager:  [phone]

## 2018-11-20 NOTE — ED TRIAGE NOTES
Pt presents with feeling weak, lightheaded, and passing black stools times two in the night. Denies abdominal pain or vomiting. Reports started feeling tired yesterday. Has taken some ibuprofen the last few days for chronic neck pain. Skin pink warm and dry.

## 2018-11-20 NOTE — IP AVS SNAPSHOT
[facility]    [address]    [facility] [address]    Phone:  [phone]    Fax:  [phone]                                       After Visit Summary   11/20/2018    Wesley Art    MRN: [id number]           After Visit Summary Signature Page     I have received my discharge instructions, and my questions have been answered. I have discussed any challenges I see with this plan with the nurse or doctor.    ..........................................................................................................................................  Patient/Patient Representative Signature      ..........................................................................................................................................  Patient Representative Print Name and Relationship to Patient    ..................................................               ................................................  Date                                   Time    ..........................................................................................................................................  Reviewed by Signature/Title    ...................................................              ..............................................  Date                                               Time          22EPIC Rev 08/18

## 2018-11-20 NOTE — PROCEDURES
PRE-PROCEDURE NOTE    CHIEF COMPLAINT / REASON FOR PROCEDURE:  melena    History and Physical Reviewed:  yes    PRE-SEDATION ASSESSMENT:    Lung Exam:  normal  Heart Exam:  normal  Mallampati Airway Classification:  2   Previous reaction to anesthesia/sedation:   no  Sedation plan based on assessment:  moderate  Comment(s):  none  ASA Classification:  3    IMPRESSION:  Rule out upper GI bleeding    PLAN:  egd    Martha Isbell MD

## 2018-11-20 NOTE — PROGRESS NOTES
GI Brief Note     Please see full procedure note under Chart Review --> Procedures    Findings:  S/p Alba en y gastric bypass.   Large clot in the stomach, suctioned.  Active oozing from an 8-10mm ulcer directly on the GJ anastamosis.  Injected with 1.5ml epi and 2 clips placed.    Bleeding stopped with clips.    A/P:  PPI Infusion  NPO other than ice chips  Careful monitoring of hemoglobin - I expect it will drop from previous. If change in hemodynamics or continued drop of hgb concerning for ongoing GI bleeding, patient will need urgent surgery consult (likely bariatric surgery).  Endoscopic options exhausted this afternoon - difficult given firm tissue from previous ulcers and surgery.  Interventional Radiology not a good option given gastric bypass.    Discussed with Latonya Chapman.  I think patient needs higher level of monitoring overnight than gen. Medicine floor.    Martha Isbell MD  [facility]  Cell/Pager: [phone]  After 5pm please call [phone]'

## 2018-11-20 NOTE — H&P
History and Physical     Wesley Art MRN# [id number]   YOB: 1955 Age: 63 year old      Date of Admission:  11/20/2018    Primary care provider: Ailyn Gutierrez          Assessment and Plan:   Wesley Art is a 63 year old male with a PMH significant for s/p bradley en y gastric bypass 2005, hx of gastric ulcer due to NSAIDs s/p clipping 2007/2008, who presents with 2 episodes of melenic stool that started this morning.  He admits to taking NSAIDs for his neck pain and has recently stopped his PPI.  Here in the emergency room he remains hemodialyze stable, hemoglobin is 12 from 14.  He will be admitted to the hospital for suspected upper GI bleed.    1.  Suspected upper GI bleed-given his resumption of NSAIDs I suspect this is the cause for his GI bleed.  Blood pressure and heart rate stable.  Will request GI evaluation for upper endoscopy.  Continue twice daily PPI IV for now, IV hydration, n.p.o. Status.    -Repeat hemoglobin at 1700    2.  Anxiety/depression-stable continue Wellbutrin and Zoloft    3.  BPH-continue Cardura and Proscar    4.  Remote history of alcoholism-he has been sober for 11 years.  Denies alcohol use.    Registered to observation  N.p.o. for now until after EGD  Disposition-possibly home tomorrow if remains stable    Hospitalist Addendum:  D/W Dr. Isbell,  patient apparently had actively bleeding gastric ulcer.  Status post epi injection and 2 clips.  Given his past surgery tissue was quite scarred and friable.  Condition could be quite tenuous and needs very close monitoring overnight.  If concerns for re-bleeding, will need transfer to bariatric surgeon ([facility] at [facility] or [facility])  Continue PPI drip and n.p.o. Status  Will change to inpatient status                Chief Complaint:   Melanotic stool and lightheadedness and dizziness         History of Present Illness:   Wesley Art is a 63 year old male who presents with 1 day history  of lightheadedness and dizziness and mild shortness of breath along with melanotic stools that started overnight.  History is obtained by speaking with the ER physician patient interview and chart review.  Patient is status post Alba-en-Y surgery in 2005.  This is complicated shortly with an gastric ulcer or possibly bleeding around the anastomosis site in 2006.  Subsequently he again developed black stools in 2008 and felt that was related to NSAIDs as he does take that for chronic neck pain.  Since then he has been doing okay however in the last few weeks he has been increasing his NSAID intake due to neck pain.  He has also taken himself off of PPI.  Yesterday he states that he was quite fatigued with somewhat lightheaded and dizzy with ambulation.  Overnight he had an episode of tar-like stool and again around 6:00 this morning.  When he got up to go the bathroom he was also lightheaded and dizzy and ultimately prompted him to come into the emergency room.  Currently here in the emergency room he is afebrile vital signs are stable.  Hemoglobin is at 12 initially.             Past Medical History:     Past Medical History:   Diagnosis Date     Alcohol abuse, unspecified     PMH alcohol abuse     Depressive disorder, not elsewhere classified      Duodenal ulcer      Essential hypertension, benign     resolved     Hematuria      Obesity, unspecified      Osteoarthritis 6/12/2015     Type II or unspecified type diabetes mellitus without mention of complication, not stated as uncontrolled     resolved           Past Surgical History:     Past Surgical History:   Procedure Laterality Date     APPENDECTOMY  child     CHOLECYSTECTOMY, LAPOROSCOPIC  1998    Cholecystectomy, Laparoscopic     LAPAROSCOPIC REVISION GASTROPLASTY TO ALBA-EN-Y  2006     VASECTOMY  1980             Social History:     Social History     Social History     Marital status:      Spouse name: N/A     Number of children: N/A     Years of  education: N/A     Occupational History     Not on file.     Social History Main Topics     Smoking status: Never Smoker     Smokeless tobacco: Never Used     Alcohol use No      Comment: sober since 11/11/2007     Drug use: No     Sexual activity: No     Other Topics Concern     Not on file     Social History Narrative               Family History:     Family History   Problem Relation Age of Onset     C.A.D. Father               Allergies:    No Known Allergies            Medications:     Prior to Admission medications    Medication Sig Last Dose Taking? Auth Provider   Ascorbic Acid (VITAMIN C PO) Take 1,000 mg by mouth daily  11/20/2018 at am Yes Reported, Patient   B-12, Methylcobalamin, 1000 MCG SUBL Place 1 tablet under the tongue every other day 11/20/2018 at am Yes Ailyn Gutierrez MD   Eros 3 MG CAPS Take 3 mg by mouth 2 times daily 11/20/2018 at x 1 Yes Reported, Patient   buPROPion (WELLBUTRIN SR) 100 MG 12 hr tablet TAKE 1 TABLET TWICE A DAY 11/20/2018 at x 1 Yes Ailyn Gutierrez MD   Cholecalciferol (VITAMIN D) 2000 UNITS tablet Take 2,000 Units by mouth 2 times daily 11/20/2018 at x 1 Yes Ailyn Gutierrez MD   doxazosin (CARDURA) 4 MG tablet Take 4 mg by mouth 2 times daily 11/20/2018 at x 1 Yes Unknown, Entered By History   finasteride (PROSCAR) 5 MG tablet Take 1 tablet (5 mg) by mouth daily 11/20/2018 at am Yes Ailyn Gutierrez MD   Multiple Vitamins-Minerals (MULTI COMPLETE PO) Take 1 tablet by mouth daily  11/20/2018 at am Yes Reported, Patient   sertraline (ZOLOFT) 100 MG tablet Take 1 tablet (100 mg) by mouth daily 11/20/2018 at am Yes Ailyn Gutierrez MD              Review of Systems:   A Comprehensive greater than 10 system review of systems was carried out.  Pertinent positives and negatives are noted above.  Otherwise negative for contributory information.            Physical Exam:   Blood pressure 110/69, pulse 103, temperature 97.1  F (36.2  C), temperature  "source Oral, resp. rate 20, height 1.88 m (6' 2\"), weight 125.9 kg (277 lb 8 oz), SpO2 100 %.  Exam:  GENERAL:  Comfortable.   PSYCH: pleasant, oriented, No acute distress.  HEENT:  PERRLA. Normal conjunctiva, normal hearing, nasal mucosa and Oropharynx are normal.  NECK:  Supple, no neck vein distention, adenopathy or bruits, normal thyroid.  HEART:  Normal S1, S2 with no murmur, no pericardial rub, gallops or S3 or S4.  LUNGS:  Clear to auscultation, normal Respiratory effort. No wheezing, rales or ronchi.  ABDOMEN:  Soft, no hepatosplenomegaly, normal bowel sounds. Non-tender, non distended.   EXTREMITIES:  No pedal edema, +2 pulses bilateral and equal.  SKIN:  Dry to touch, No rash, wound or ulcerations.  NEUROLOGIC:  CN 2-12 intact, BL 5/5 symmetric upper and lower extremity strength, sensation is intact with no focal deficits.           Data:       Recent Labs  Lab 11/20/18  0838   WBC 7.4   HGB 12.2*   HCT 37.1*   MCV 98          Recent Labs  Lab 11/20/18  0838      POTASSIUM 4.3   CHLORIDE 110*   CO2 28   ANIONGAP 3   *   BUN 45*   CR 1.00   GFRESTIMATED 75   GFRESTBLACK >90   SHAREE 8.6       Recent Labs  Lab 11/20/18  0838   AST 16   ALT 28   ALKPHOS 64   BILITOTAL 0.2       Recent Labs  Lab 11/20/18  0838   INR 1.01         Results for orders placed or performed in visit on 02/10/06   X-RAY IV PYELOGRAM+TOMOGRAPHY    Impression       IVP   HISTORY:  Question kidney stone.   FINDINGS:  Both kidneys function promptly and symmetrically.  The   renal outlines are normal.  The pyelocaliceal systems, ureters and   bladder are normal.  No ureteral dilatation.  The bladder is   unremarkable, although the patient has a large postvoid residual or   was unable to void.                Latonya Chapman PA-C    This patient was seen and discussed with Dr. Douglas who agrees with the current plans as outlined above.     "

## 2018-11-21 LAB
ANION GAP SERPL CALCULATED.3IONS-SCNC: 6 MMOL/L (ref 3–14)
BUN SERPL-MCNC: 33 MG/DL (ref 7–30)
CALCIUM SERPL-MCNC: 7.7 MG/DL (ref 8.5–10.1)
CHLORIDE SERPL-SCNC: 114 MMOL/L (ref 94–109)
CO2 SERPL-SCNC: 26 MMOL/L (ref 20–32)
CREAT SERPL-MCNC: 0.83 MG/DL (ref 0.66–1.25)
ERYTHROCYTE [DISTWIDTH] IN BLOOD BY AUTOMATED COUNT: 12.6 % (ref 10–15)
FERRITIN SERPL-MCNC: 31 NG/ML (ref 26–388)
GFR SERPL CREATININE-BSD FRML MDRD: >90 ML/MIN/1.7M2
GLUCOSE BLDC GLUCOMTR-MCNC: 102 MG/DL (ref 70–99)
GLUCOSE BLDC GLUCOMTR-MCNC: 104 MG/DL (ref 70–99)
GLUCOSE BLDC GLUCOMTR-MCNC: 105 MG/DL (ref 70–99)
GLUCOSE BLDC GLUCOMTR-MCNC: 110 MG/DL (ref 70–99)
GLUCOSE BLDC GLUCOMTR-MCNC: 112 MG/DL (ref 70–99)
GLUCOSE SERPL-MCNC: 94 MG/DL (ref 70–99)
HCT VFR BLD AUTO: 26.7 % (ref 40–53)
HGB BLD-MCNC: 8.5 G/DL (ref 13.3–17.7)
HGB BLD-MCNC: 8.6 G/DL (ref 13.3–17.7)
HGB BLD-MCNC: 8.7 G/DL (ref 13.3–17.7)
HGB BLD-MCNC: 9.2 G/DL (ref 13.3–17.7)
IRON SATN MFR SERPL: 34 % (ref 15–46)
IRON SERPL-MCNC: 116 UG/DL (ref 35–180)
MCH RBC QN AUTO: 31.9 PG (ref 26.5–33)
MCHC RBC AUTO-ENTMCNC: 32.2 G/DL (ref 31.5–36.5)
MCV RBC AUTO: 99 FL (ref 78–100)
PLATELET # BLD AUTO: 217 10E9/L (ref 150–450)
POTASSIUM SERPL-SCNC: 3.4 MMOL/L (ref 3.4–5.3)
RBC # BLD AUTO: 2.7 10E12/L (ref 4.4–5.9)
SODIUM SERPL-SCNC: 146 MMOL/L (ref 133–144)
TIBC SERPL-MCNC: 339 UG/DL (ref 240–430)
WBC # BLD AUTO: 5.9 10E9/L (ref 4–11)

## 2018-11-21 PROCEDURE — 00000146 ZZHCL STATISTIC GLUCOSE BY METER IP

## 2018-11-21 PROCEDURE — 25000128 H RX IP 250 OP 636: Performed by: PHYSICIAN ASSISTANT

## 2018-11-21 PROCEDURE — 99207 ZZC CDG-MDM COMPONENT: MEETS LOW - DOWN CODED: CPT | Performed by: INTERNAL MEDICINE

## 2018-11-21 PROCEDURE — 85027 COMPLETE CBC AUTOMATED: CPT | Performed by: PHYSICIAN ASSISTANT

## 2018-11-21 PROCEDURE — 25000132 ZZH RX MED GY IP 250 OP 250 PS 637: Performed by: PHYSICIAN ASSISTANT

## 2018-11-21 PROCEDURE — C9113 INJ PANTOPRAZOLE SODIUM, VIA: HCPCS | Performed by: INTERNAL MEDICINE

## 2018-11-21 PROCEDURE — 85018 HEMOGLOBIN: CPT | Performed by: INTERNAL MEDICINE

## 2018-11-21 PROCEDURE — 36415 COLL VENOUS BLD VENIPUNCTURE: CPT | Performed by: PHYSICIAN ASSISTANT

## 2018-11-21 PROCEDURE — 80048 BASIC METABOLIC PNL TOTAL CA: CPT | Performed by: PHYSICIAN ASSISTANT

## 2018-11-21 PROCEDURE — 99232 SBSQ HOSP IP/OBS MODERATE 35: CPT | Performed by: INTERNAL MEDICINE

## 2018-11-21 PROCEDURE — 36415 COLL VENOUS BLD VENIPUNCTURE: CPT | Performed by: INTERNAL MEDICINE

## 2018-11-21 PROCEDURE — 12000000 ZZH R&B MED SURG/OB

## 2018-11-21 PROCEDURE — 25000128 H RX IP 250 OP 636: Performed by: INTERNAL MEDICINE

## 2018-11-21 RX ORDER — PANTOPRAZOLE SODIUM 40 MG/10ML
40 INJECTION, POWDER, LYOPHILIZED, FOR SOLUTION INTRAVENOUS 2 TIMES DAILY
Status: DISCONTINUED | OUTPATIENT
Start: 2018-11-21 | End: 2018-11-22 | Stop reason: HOSPADM

## 2018-11-21 RX ADMIN — PANTOPRAZOLE SODIUM 40 MG: 40 INJECTION, POWDER, FOR SOLUTION INTRAVENOUS at 21:10

## 2018-11-21 RX ADMIN — SODIUM CHLORIDE, PRESERVATIVE FREE: 5 INJECTION INTRAVENOUS at 01:59

## 2018-11-21 RX ADMIN — SODIUM CHLORIDE, PRESERVATIVE FREE: 5 INJECTION INTRAVENOUS at 11:58

## 2018-11-21 RX ADMIN — SODIUM CHLORIDE, PRESERVATIVE FREE: 5 INJECTION INTRAVENOUS at 22:02

## 2018-11-21 RX ADMIN — SERTRALINE HYDROCHLORIDE 100 MG: 100 TABLET ORAL at 08:52

## 2018-11-21 RX ADMIN — SODIUM CHLORIDE 8 MG/HR: 9 INJECTION, SOLUTION INTRAVENOUS at 02:28

## 2018-11-21 RX ADMIN — DOXAZOSIN 4 MG: 4 TABLET ORAL at 08:52

## 2018-11-21 RX ADMIN — BUPROPION HYDROCHLORIDE 100 MG: 100 TABLET, FILM COATED, EXTENDED RELEASE ORAL at 08:52

## 2018-11-21 RX ADMIN — SODIUM CHLORIDE 8 MG/HR: 9 INJECTION, SOLUTION INTRAVENOUS at 12:15

## 2018-11-21 RX ADMIN — Medication 1 MG: at 21:13

## 2018-11-21 RX ADMIN — BUPROPION HYDROCHLORIDE 100 MG: 100 TABLET, FILM COATED, EXTENDED RELEASE ORAL at 21:10

## 2018-11-21 RX ADMIN — FINASTERIDE 5 MG: 5 TABLET, FILM COATED ORAL at 08:52

## 2018-11-21 RX ADMIN — ACETAMINOPHEN 650 MG: 325 TABLET, FILM COATED ORAL at 11:45

## 2018-11-21 RX ADMIN — DOXAZOSIN 4 MG: 4 TABLET ORAL at 21:10

## 2018-11-21 ASSESSMENT — ACTIVITIES OF DAILY LIVING (ADL)
ADLS_ACUITY_SCORE: 11

## 2018-11-21 ASSESSMENT — PAIN DESCRIPTION - DESCRIPTORS: DESCRIPTORS: ACHING

## 2018-11-21 NOTE — PROGRESS NOTES
[facility]    Hospitalist Progress Note    Date of Service (when I saw the patient): 11/21/2018    Assessment & Plan     63 year old male with a PMH significant for s/p bradley en y gastric bypass 2005, hx of gastric ulcer due to NSAIDs s/p clipping 2007/2008, who presents with episodes of melenic stool. He admits to taking NSAIDs for his neck pain and has recently stopped his PPI.       #  Upper GI bleed 2/2 anastomotic gastric ulcer  #  Acute blood loss anemia due to GI bleeding  EGD showed active oozing from an ulcer directly on GJ anastomosis. Status post epi injection and 2 clips.  Given his past surgery tissue was quite scarred and friable. Most likely NSAID related  -- Currently being monitored in ICU, continue PPI drip, supportive cares, IVF  -- Continue close monitoring of Hb q6h  -- Per GI, endoscopic options exhausted and IR not a good option given gastric bypass. If concern for ongoing bleeding or decline in condition, then transfer to center with bariatric surgery   -- His Hb has dropped from 12 to 8 range, although stable there. No signs of recurrence of bleeding so far  -- Transfuse if signs of active bleeding or further drop in Hb particularly below 7      # Anxiety/depression-stable continue Wellbutrin and Zoloft     # BPH-continue Cardura and Proscar     # Remote history of alcoholism-he has been sober for 11 years.  Denies alcohol use.    # Chronic neck pain. Advised pt to avoid NSAID's going forward. Use tylenol and discuss other pain control options with PCP     DVT Prophylaxis: Pneumatic Compression Devices    Code Status: Full Code    Disposition: Expected discharge in 2 days once no further bleeding     Tauseef Ur Stella    Interval History   Chart reviewed and patient seen. Case discussed with nursing staff.     Patient feels well, Denies any chest pain, shortness of breath. No abdominal pain. Had a small black BM, no hematemesis, No other complaints voiced.     -Data reviewed  today: I reviewed all new labs and imaging results over the last 24 hours. I personally reviewed     Physical Exam   Temp: 98.4  F (36.9  C) Temp src: Oral BP: 121/71 Pulse: 72 Heart Rate: 73 Resp: 14 SpO2: 100 % O2 Device: None (Room air) Oxygen Delivery: 2 LPM  Vitals:    11/20/18 1226 11/20/18 1846 11/21/18 0600   Weight: 125.9 kg (277 lb 8 oz) 125.3 kg (276 lb 3.8 oz) 125.9 kg (277 lb 9 oz)     Vital Signs with Ranges  Temp:  [97.5  F (36.4  C)-98.4  F (36.9  C)] 98.4  F (36.9  C)  Pulse:  [72] 72  Heart Rate:  [62-90] 73  Resp:  [12-21] 14  BP: ()/(45-85) 121/71  SpO2:  [93 %-100 %] 100 %  I/O last 3 completed shifts:  In: 1383.33 [I.V.:1383.33]  Out: 1190 [Urine:1165; Stool:25]    GENERAL:  Awake and alert, No acute distress.  PSYCH: appropriate affect, no acute agitation   HEENT:  Neck is Supple, trachea is midline, EOMI, conjunctiva clear  CARDIOVASCULAR: Regular rate and rhythm, Normal S1, S2, no loud murmurs  PULMONARY:  Clear to auscultation bilaterally with good entry on both sides  GI: Abdomen is soft, non tender, non-distended, bowel sounds present. No rebound or guarding   SKIN:  No cyanosis or clubbing, no obvious exanthems on exposed areas   MSK: Extremities are warm and well perfused. No pitting edema   Neuro: Awake and oriented x 3. Moving all extremities with good strength       Medications     pantoprazole (PROTONIX) infusion ADULT/PEDS GREATER than or EQUAL to 45 kg 8 mg/hr (11/21/18 1215)     sodium chloride 100 mL/hr at 11/21/18 1200       buPROPion  100 mg Oral BID     doxazosin  4 mg Oral BID     finasteride  5 mg Oral Daily     sertraline  100 mg Oral Daily       Data   All new lab data and imaging results from today have been reviewed       Recent Labs  Lab 11/21/18  1248 11/21/18  0509 11/21/18  0216  11/20/18  0838   WBC  --  5.9  --   --  7.4   HGB 9.2* 8.6* 8.7*  < > 12.2*   MCV  --  99  --   --  98   PLT  --  217  --   --  294   INR  --   --   --   --  1.01   NA  --  146*  --    --  141   POTASSIUM  --  3.4  --   --  4.3   CHLORIDE  --  114*  --   --  110*   CO2  --  26  --   --  28   BUN  --  33*  --   --  45*   CR  --  0.83  --   --  1.00   ANIONGAP  --  6  --   --  3   SHAREE  --  7.7*  --   --  8.6   GLC  --  94  --   --  109*   ALBUMIN  --   --   --   --  3.3*   PROTTOTAL  --   --   --   --  6.6*   BILITOTAL  --   --   --   --  0.2   ALKPHOS  --   --   --   --  64   ALT  --   --   --   --  28   AST  --   --   --   --  16   < > = values in this interval not displayed.    No results found for this or any previous visit (from the past 24 hour(s)).

## 2018-11-21 NOTE — PROGRESS NOTES
"GASTROENTEROLOGY PROGRESS NOTE        SUBJECTIVE:  No abdominal pain.  Past small amount of black stool this morning.  No nausea, no vomiting, fever.     OBJECTIVE:    /74  Pulse 72  Temp 98.4  F (36.9  C) (Oral)  Resp 14  Ht 1.88 m (6' 2\")  Wt 125.9 kg (277 lb 9 oz)  SpO2 97%  BMI 35.64 kg/m2  Temp (24hrs), Av.9  F (36.6  C), Min:97.1  F (36.2  C), Max:98.4  F (36.9  C)    Patient Vitals for the past 72 hrs:   Weight   18 0600 125.9 kg (277 lb 9 oz)   18 1846 125.3 kg (276 lb 3.8 oz)   18 1226 125.9 kg (277 lb 8 oz)   18 0821 126.6 kg (279 lb 1.6 oz)       Intake/Output Summary (Last 24 hours) at 18 1101  Last data filed at 18 0846   Gross per 24 hour   Intake          1733.16 ml   Output             1640 ml   Net            93.16 ml        PHYSICAL EXAM     Constitutional: No distress, resting comfortably  Cardiovascular: RRR, normal S1, S2, no murmur appreciated   Respiratory: good transmission, CTAB  Abdomen:  positive bowel sounds, soft, nontender         Additional Comments:  ROS, FH, SH: See initial GI consult for details.     I have reviewed the patient's new clinical lab results:     Recent Labs   Lab Test  18   0509  18   0216  18   2209   18   0838  06/12/15   0925   WBC  5.9   --    --    --   7.4  5.0   HGB  8.6*  8.7*  8.8*   < >  12.2*  14.2   MCV  99   --    --    --   98  94.6   PLT  217   --    --    --   294  251   INR   --    --    --    --   1.01   --     < > = values in this interval not displayed.     Recent Labs   Lab Test  18   0509  18   0838  18   1006   POTASSIUM  3.4  4.3  4.3   CHLORIDE  114*  110*  106   CO2  26  28   --    BUN  33*  45*  16  14   ANIONGAP  6  3   --      Recent Labs   Lab Test  18   0838  18   1006  17   1010   ALBUMIN  3.3*  4.4  4.1   BILITOTAL  0.2  0.5  0.5   ALT  28  22  23   AST  16  25  27     ENDOSCOPY   EGD :  Findings:        The esophagus " was normal        There was a large clot in the stomach, this was suctioned.        A shallow 8-10mm ulcer was seen directly at the GJ anastamosis. There        was active oozing with pooling blood around the ulcer. 3 injections of        epi were done around the ulcer 0.5ml in each. Bleeding slowed. 2 clips        placed across the ulcer. Very firm tissue, difficult to close with        clips. Bleeding stopped with the clips.     Impression:         Actively oozing ulcer at the GJ anastamosis.                             Injected with epi and clipped. This stopped the                             bleeding at the time of the procedure.    ASSESSMENT/ PLAN  Wesley Art is a 63-year-old male with medical history of Alba-en-Y gastric bypass, history of gastric ulcer in 2008, presented to the hospital with abdominal pain thought to be mildly anemic with a bleeding gastric ulcer seen on upper endoscopy.    1.  Gastric ulcer: Suspect related to NSAID use.  Continue PPI drip.  If hemoglobin drops or becomes hemodynamically unstable with overt GI bleeding, would require urgent surgery consult as endoscopic options have been exhausted.  IR is not a good option given previous gastric bypass.   -- Clears today.    2.  Acute blood loss anemia: Secondary to bleeding ulcer.  Hemoglobin stable at 8.6.  Serial hemoglobin.   Transfusions per medicine team    Rose Caal PA-C  [facility]

## 2018-11-21 NOTE — PROGRESS NOTES
GI Brief note    Patient looking much better today.   Hemoglobin dropped from admission but seems to have stabilized.    - change  PPI to BID IV dosing  - advance diet to full liquids  - I would be ok with transfer out of ICU  - if still stable in the morning, advance to low fiber diet - continue low fiber for about 2 weeks  - at discharge, should be on omeprazole 40mg BID (before breakfast and dinner) for 8 weeks  -repeat EGD in 8 weeks to make sure ulcer healed  - no NSAIDs for pain  - given gastric bypass, he would likely benefit from an IV iron infusion      Martha Isbell MD  [facility]  Cell/Pager: [phone]  After 5pm please call [phone]

## 2018-11-21 NOTE — PLAN OF CARE
Problem: Patient Care Overview  Goal: Plan of Care/Patient Progress Review  Outcome: Improving  ICU End of Shift Summary.  For vital signs and complete assessments, please see documentation flowsheets.     Pertinent assessments: A&O -Tele SR- VSS - Afebrile- Hgb stable-  protonix gtt infusing, after this bag done will start po protonix.  2 small black stools .   Major Shift Events:   Plan (Upcoming Events): transfer to floor  Discharge/Transfer Needs: TBD    Bedside Shift Report Completed :   Bedside Safety Check Completed:

## 2018-11-21 NOTE — PLAN OF CARE
Problem: Patient Care Overview  Goal: Plan of Care/Patient Progress Review  Outcome: No Change  Ambulatory Status:  Pt up 2 assist to bedside commode. Patient is feeling dizzy while getting up. Encouraged to stay in bed as much as possible due to being dizzy.  VS:  Vital signs:  Temp: 98  F (36.7  C) Temp src: Oral BP: 116/74 Pulse: 72 Heart Rate: 69 Resp: 14 SpO2: 96 % O2 Device: None (Room air)  Pain:  None noted  Resp: LS clear  GI:  no nausea.  poor appetite and on NPO with ice chips. BS active.  Passing flatus.  Last BM was today in the morning (blood noted in stool per patient)   :  Voiding WNL  Skin:  WNL  Tx:  Two IV (left arm SL and right arm infusing). IV protonix running at 10mL/hr and IV NS running 100 mL/hr.  Labs:  Hemoglobin 12.2, recheck was scheduled at 1800  Consults:  surgery  Disposition:  Transferring to ICU room 361. Gave report to JORDAN Metzger.

## 2018-11-21 NOTE — PLAN OF CARE
Problem: Patient Care Overview  Goal: Plan of Care/Patient Progress Review  Outcome: Improving  ICU End of Shift Summary.  For vital signs and complete assessments, please see documentation flowsheets.     Pertinent assessments: AOx4. Tylenol x1 for pain with relief. Afebrile. VSS. Lungs clear. RA. 1 small BM. No nausea.  Major Shift Events: 1 small black BM. Hgb 8.6  Plan (Upcoming Events): Continue current plan of care  Discharge/Transfer Needs: Home    Bedside Shift Report Completed : Y  Bedside Safety Check Completed Y

## 2018-11-22 VITALS
SYSTOLIC BLOOD PRESSURE: 117 MMHG | RESPIRATION RATE: 18 BRPM | DIASTOLIC BLOOD PRESSURE: 66 MMHG | TEMPERATURE: 97.6 F | OXYGEN SATURATION: 96 % | WEIGHT: 277.56 LBS | HEIGHT: 74 IN | BODY MASS INDEX: 35.62 KG/M2 | HEART RATE: 65 BPM

## 2018-11-22 PROBLEM — K28.9 ANASTOMOTIC ULCER: Status: ACTIVE | Noted: 2018-11-22

## 2018-11-22 LAB
ANION GAP SERPL CALCULATED.3IONS-SCNC: 4 MMOL/L (ref 3–14)
BUN SERPL-MCNC: 15 MG/DL (ref 7–30)
CALCIUM SERPL-MCNC: 7.9 MG/DL (ref 8.5–10.1)
CHLORIDE SERPL-SCNC: 112 MMOL/L (ref 94–109)
CO2 SERPL-SCNC: 27 MMOL/L (ref 20–32)
CREAT SERPL-MCNC: 0.86 MG/DL (ref 0.66–1.25)
ERYTHROCYTE [DISTWIDTH] IN BLOOD BY AUTOMATED COUNT: 12.5 % (ref 10–15)
GFR SERPL CREATININE-BSD FRML MDRD: 89 ML/MIN/1.7M2
GLUCOSE SERPL-MCNC: 131 MG/DL (ref 70–99)
HCT VFR BLD AUTO: 26.8 % (ref 40–53)
HGB BLD-MCNC: 7.9 G/DL (ref 13.3–17.7)
HGB BLD-MCNC: 8.8 G/DL (ref 13.3–17.7)
MCH RBC QN AUTO: 32.5 PG (ref 26.5–33)
MCHC RBC AUTO-ENTMCNC: 32.8 G/DL (ref 31.5–36.5)
MCV RBC AUTO: 99 FL (ref 78–100)
PLATELET # BLD AUTO: 217 10E9/L (ref 150–450)
POTASSIUM SERPL-SCNC: 3.6 MMOL/L (ref 3.4–5.3)
RBC # BLD AUTO: 2.71 10E12/L (ref 4.4–5.9)
SODIUM SERPL-SCNC: 143 MMOL/L (ref 133–144)
WBC # BLD AUTO: 5.2 10E9/L (ref 4–11)

## 2018-11-22 PROCEDURE — 25000128 H RX IP 250 OP 636: Performed by: INTERNAL MEDICINE

## 2018-11-22 PROCEDURE — 25000132 ZZH RX MED GY IP 250 OP 250 PS 637: Performed by: PHYSICIAN ASSISTANT

## 2018-11-22 PROCEDURE — C9113 INJ PANTOPRAZOLE SODIUM, VIA: HCPCS | Performed by: INTERNAL MEDICINE

## 2018-11-22 PROCEDURE — 36415 COLL VENOUS BLD VENIPUNCTURE: CPT | Performed by: INTERNAL MEDICINE

## 2018-11-22 PROCEDURE — 25000128 H RX IP 250 OP 636: Performed by: PHYSICIAN ASSISTANT

## 2018-11-22 PROCEDURE — 85027 COMPLETE CBC AUTOMATED: CPT | Performed by: INTERNAL MEDICINE

## 2018-11-22 PROCEDURE — 99238 HOSP IP/OBS DSCHRG MGMT 30/<: CPT | Performed by: INTERNAL MEDICINE

## 2018-11-22 PROCEDURE — 80048 BASIC METABOLIC PNL TOTAL CA: CPT | Performed by: INTERNAL MEDICINE

## 2018-11-22 PROCEDURE — 90662 IIV NO PRSV INCREASED AG IM: CPT | Performed by: INTERNAL MEDICINE

## 2018-11-22 RX ORDER — CYCLOBENZAPRINE HCL 5 MG
2.5-5 TABLET ORAL 3 TIMES DAILY PRN
Qty: 20 TABLET | Refills: 0 | Status: SHIPPED | OUTPATIENT
Start: 2018-11-22 | End: 2019-12-06

## 2018-11-22 RX ORDER — OMEPRAZOLE 40 MG/1
40 CAPSULE, DELAYED RELEASE ORAL
Qty: 30 CAPSULE | Refills: 1
Start: 2018-11-22 | End: 2018-11-26

## 2018-11-22 RX ADMIN — FINASTERIDE 5 MG: 5 TABLET, FILM COATED ORAL at 08:35

## 2018-11-22 RX ADMIN — INFLUENZA A VIRUS A/MICHIGAN/45/2015 X-275 (H1N1) ANTIGEN (FORMALDEHYDE INACTIVATED), INFLUENZA A VIRUS A/SINGAPORE/INFIMH-16-0019/2016 IVR-186 (H3N2) ANTIGEN (FORMALDEHYDE INACTIVATED), AND INFLUENZA B VIRUS B/MARYLAND/15/2016 BX-69A (A B/COLORADO/6/2017-LIKE VIRUS) ANTIGEN (FORMALDEHYDE INACTIVATED) 0.5 ML: 60; 60; 60 INJECTION, SUSPENSION INTRAMUSCULAR at 15:59

## 2018-11-22 RX ADMIN — SODIUM CHLORIDE, PRESERVATIVE FREE: 5 INJECTION INTRAVENOUS at 07:14

## 2018-11-22 RX ADMIN — SERTRALINE HYDROCHLORIDE 100 MG: 100 TABLET ORAL at 08:35

## 2018-11-22 RX ADMIN — BUPROPION HYDROCHLORIDE 100 MG: 100 TABLET, FILM COATED, EXTENDED RELEASE ORAL at 08:35

## 2018-11-22 RX ADMIN — PANTOPRAZOLE SODIUM 40 MG: 40 INJECTION, POWDER, FOR SOLUTION INTRAVENOUS at 08:35

## 2018-11-22 RX ADMIN — DOXAZOSIN 4 MG: 4 TABLET ORAL at 08:35

## 2018-11-22 RX ADMIN — ACETAMINOPHEN 650 MG: 325 TABLET, FILM COATED ORAL at 07:49

## 2018-11-22 ASSESSMENT — ACTIVITIES OF DAILY LIVING (ADL)
ADLS_ACUITY_SCORE: 11

## 2018-11-22 NOTE — PROGRESS NOTES
"Gastroenterology Progress Note     Summary: 62 yo male with hx RNY bypass presenting with melena, anemia found to have anastamotic ulcer sp treatment now stable    Impression & Plan:   - advance diet to low fiber  - if tolerate, ok to discharge - give flu shot  - pt to return if further bleeding or sx of  Bleeding  - will arrange repeat egd in 2 month  - [facility] will arrange IV iron as well   - omeprazole 40 mg twice a day     Brenda Batista MD MS....................  2018   10:27 AM   Pager [phone]  After 5 PM call [phone]    [facility]          Subjective/24 hour events:    Stools have cleared  Tolerating full    Objective:    /66 (BP Location: Right arm)  Pulse 65  Temp 97.6  F (36.4  C) (Oral)  Resp 18  Ht 1.88 m (6' 2\")  Wt 125.9 kg (277 lb 9 oz)  SpO2 96%  BMI 35.64 kg/m2  Temp (24hrs), Av.3  F (36.3  C), Min:96.3  F (35.7  C), Max:98.2  F (36.8  C)    Patient Vitals for the past 72 hrs:   Weight   18 0600 125.9 kg (277 lb 9 oz)   18 1846 125.3 kg (276 lb 3.8 oz)   18 1226 125.9 kg (277 lb 8 oz)   18 0821 126.6 kg (279 lb 1.6 oz)       Medications (Scheduled)    buPROPion  100 mg Oral BID     doxazosin  4 mg Oral BID     finasteride  5 mg Oral Daily     pantoprazole  40 mg Intravenous BID     sertraline  100 mg Oral Daily       PRN Medications  acetaminophen, lidocaine 4%, melatonin, naloxone, ondansetron **OR** ondansetron    Physical exam:  General Appearance:  Alert, pleasant  Eyes: nl  Respiratory: nl  CV: reg  Gastrointestinal: soft    Data:  All relevant data has been reviewed. Pertinent information:     Labs:   Recent Labs   Lab Test  18   0735  18   2351  18   1838   18   0509   18   0838   WBC  5.2   --    --    --   5.9   --   7.4   HGB  8.8*  7.9*  8.5*   < >  8.6*   < >  12.2*   MCV  99   --    --    --   99   --   98   PLT  217   --    --    --   217   --   294   INR   --    --    --    --    " --    --   1.01    < > = values in this interval not displayed.     Recent Labs   Lab Test  11/22/18   0735  11/21/18   0509  11/20/18   0838   POTASSIUM  3.6  3.4  4.3   CHLORIDE  112*  114*  110*   CO2  27  26  28   BUN  15  33*  45*   ANIONGAP  4  6  3     Recent Labs   Lab Test  11/20/18   0838  07/17/18   1006  07/12/17   1010   ALBUMIN  3.3*  4.4  4.1   BILITOTAL  0.2  0.5  0.5   ALT  28  22  23   AST  16  25  27              None

## 2018-11-22 NOTE — PLAN OF CARE
Problem: Patient Care Overview  Goal: Plan of Care/Patient Progress Review  Outcome: No Change  A&O. VSS. SBA. Has had 1 small black stool. +BS. Voiding. Hemoglobin lower, see results. Denies dizziness, shortness of breath. Denied pain. Voiding.

## 2018-11-22 NOTE — PROGRESS NOTES
Pt D/C'd home with son at 1620. AVS reviewed and prescription for flexeril sent with pt. Pt denied having any questions. VSS.

## 2018-11-22 NOTE — PLAN OF CARE
Problem: Gastrointestinal Bleeding (Adult)  Goal: Signs and Symptoms of Listed Potential Problems Will be Absent, Minimized or Managed (Gastrointestinal Bleeding)  Signs and symptoms of listed potential problems will be absent, minimized or managed by discharge/transition of care (reference Gastrointestinal Bleeding (Adult) CPG).   Outcome: Improving  Ao, vss. CO of minimal back pain, refused intervention. IVF infusing., up sba.

## 2018-11-22 NOTE — PROGRESS NOTES
"VS /66 (BP Location: Right arm)  Pulse 65  Temp 97.6  F (36.4  C) (Oral)  Resp 18  Ht 1.88 m (6' 2\")  Wt 125.9 kg (277 lb 9 oz)  SpO2 96%  BMI 35.64 kg/m2   AOx4, Up SBA. VSS on RA. Denies pain other than mild headache this am resolved with tylenol. Advanced to low fiber diet, tolerated with no issues. One bm today, no evidence of blood. Plan to discharge to home today.     "

## 2018-11-22 NOTE — PLAN OF CARE
Problem: Patient Care Overview  Goal: Plan of Care/Patient Progress Review  Outcome: No Change  [id number]: Pt resting comfortably. A&Ox4. Denies pain.  Transfers with SBA.  Has IVF infusing. On IV protonix. BS+ passing gas, no stools this shift. Will continue to monitor.

## 2018-11-22 NOTE — DISCHARGE SUMMARY
[facility] Discharge Summary    Wesley Art MRN# [id number]   Age: 63 year old YOB: 1955     Date of Admission:  11/20/2018  Date of Discharge::  11/22/2018  Admitting Physician:  Suresh Orantes MD  Discharge Physician:  Suresh Orantes MD     Home clinic: [facility].          Admission Diagnoses:   Upper GI bleed [K92.2]  Gastrointestinal hemorrhage with melena [K92.1]          Discharge Diagnosis:   Principal Problem:    Anastomotic ulcer  Active Problems:    Melena    GIB (gastrointestinal bleeding)            Procedures:   EGD  Actively oozing ulcer at the GJ anastamosis.   Injected with epi and clipped. This stopped the   bleeding at the time of the procedure.           Discharge Medications:     Discharge Medication List as of 11/22/2018  4:10 PM      START taking these medications    Details   cyclobenzaprine (FLEXERIL) 5 MG tablet Take 0.5-1 tablets (2.5-5 mg) by mouth 3 times daily as needed for muscle spasms, Disp-20 tablet, R-0, Local Print      omeprazole (PRILOSEC) 40 MG capsule Take 1 capsule (40 mg) by mouth 2 times daily (before meals) Take 30-60 minutes before a meal., Disp-30 capsule, R-1, No Print Out         CONTINUE these medications which have NOT CHANGED    Details   Ascorbic Acid (VITAMIN C PO) Take 1,000 mg by mouth daily , Historical      B-12, Methylcobalamin, 1000 MCG SUBL Place 1 tablet under the tongue every other day, Disp-90 tablet, R-1, OTC      Boron 3 MG CAPS Take 3 mg by mouth 2 times daily, Historical      buPROPion (WELLBUTRIN SR) 100 MG 12 hr tablet TAKE 1 TABLET TWICE A DAY, Disp-180 tablet, R-3, E-Prescribe      Cholecalciferol (VITAMIN D) 2000 UNITS tablet Take 2,000 Units by mouth 2 times daily, Disp-100 tablet, R-3, Historical      doxazosin (CARDURA) 4 MG tablet Take 4 mg by mouth 2 times daily, Historical      finasteride (PROSCAR) 5 MG tablet Take 1 tablet (5 mg) by mouth daily, Disp-90 tablet, R-3, E-Prescribe      Multiple  "Vitamins-Minerals (MULTI COMPLETE PO) Take 1 tablet by mouth daily , Historical      sertraline (ZOLOFT) 100 MG tablet Take 1 tablet (100 mg) by mouth daily, Disp-90 tablet, R-3, E-Prescribe                   Consultations:   Consultation during this admission received from gastroenterology            Hospital Course:   Wesley Art is a 63 year old male with a PMH significant for s/p alba en y gastric bypass 2005, hx of gastric ulcer due to NSAIDs s/p clipping 2007/2008, who presented on 11/20/18 with episodes of melenic stool. He reported taking NSAIDs for neck pain in the past several weeks and recently stopped his PPI.      Mr. Art remained stable throughout his hospital stay.  He was taken to the endoscopy suite by Dr. Martha Isbell from [facility] and was found to have an actively oozing ulcer at the anastomosis of the old Alba-en-Y.  This was injected with epinephrine and clipped x2 with good hemostasis.  Apparently the tissue was scarred and friable.  It was surmised that the trigger for bleeding with the exposure to NSAIDs.    /66 (BP Location: Right arm)  Pulse 65  Temp 97.6  F (36.4  C) (Oral)  Resp 18  Ht 1.88 m (6' 2\")  Wt 125.9 kg (277 lb 9 oz)  SpO2 96%  BMI 35.64 kg/m2  On the day of discharge, Mr. Art is alert, comfortable and coherent.  He is eager for discharge home.  Chest: Clear to auscultation.  No increased work of breathing.  Heart: Regular rate and rhythm without rubs, murmurs or gallops.  Abdomen: Soft, nontender nondistended.          Discharge Instructions and Follow-Up:   Discharge diet: Low residue   Discharge activity: Activity as tolerated   Discharge follow-up: Follow up with [facility] in about 8 weeks. (They will call pt.)  Follow up with PMD to recheck HGB in the next week.           Discharge Disposition:   Discharged to home      Attestation:  I have reviewed today's vital signs, notes, medications, labs and imaging.  Total time: " 25 minutes    Suresh Orantes MD

## 2018-11-26 DIAGNOSIS — K92.2 UPPER GI BLEED: ICD-10-CM

## 2018-11-26 RX ORDER — OMEPRAZOLE 40 MG/1
40 CAPSULE, DELAYED RELEASE ORAL
Qty: 60 CAPSULE | Refills: 0 | Status: SHIPPED | OUTPATIENT
Start: 2018-11-26 | End: 2018-12-06

## 2018-11-26 NOTE — TELEPHONE ENCOUNTER
Due for review of this medication. Please call to schedule visit. Also needs review of other medications, recommend 30 min visit.

## 2018-11-29 ENCOUNTER — OFFICE VISIT (OUTPATIENT)
Dept: FAMILY MEDICINE | Facility: CLINIC | Age: 63
End: 2018-11-29

## 2018-11-29 VITALS
SYSTOLIC BLOOD PRESSURE: 112 MMHG | BODY MASS INDEX: 35.49 KG/M2 | WEIGHT: 276.4 LBS | DIASTOLIC BLOOD PRESSURE: 70 MMHG | HEART RATE: 79 BPM | RESPIRATION RATE: 16 BRPM | OXYGEN SATURATION: 98 %

## 2018-11-29 DIAGNOSIS — K25.4 GASTROINTESTINAL HEMORRHAGE ASSOCIATED WITH GASTRIC ULCER: ICD-10-CM

## 2018-11-29 DIAGNOSIS — M15.0 PRIMARY OSTEOARTHRITIS INVOLVING MULTIPLE JOINTS: ICD-10-CM

## 2018-11-29 DIAGNOSIS — Z23 NEED FOR PNEUMOCOCCAL VACCINATION: Primary | ICD-10-CM

## 2018-11-29 DIAGNOSIS — Z12.11 SPECIAL SCREENING FOR MALIGNANT NEOPLASMS, COLON: ICD-10-CM

## 2018-11-29 LAB
% GRANULOCYTES: 56.8 % (ref 42.2–75.2)
HCT VFR BLD AUTO: 29.5 % (ref 39–51)
HEMOGLOBIN: 9.8 G/DL (ref 13.4–17.5)
LYMPHOCYTES NFR BLD AUTO: 35.9 % (ref 20.5–51.1)
MCH RBC QN AUTO: 31.6 PG (ref 27–31)
MCHC RBC AUTO-ENTMCNC: 33.3 G/DL (ref 33–37)
MCV RBC AUTO: 94.8 FL (ref 80–100)
MONOCYTES NFR BLD AUTO: 7.3 % (ref 1.7–9.3)
PLATELET # BLD AUTO: 371 K/UL (ref 140–450)
RBC # BLD AUTO: 3.11 X10/CMM (ref 4.2–5.9)
WBC # BLD AUTO: 5.5 X10/CMM (ref 3.8–11)

## 2018-11-29 PROCEDURE — G0009 ADMIN PNEUMOCOCCAL VACCINE: HCPCS | Performed by: FAMILY MEDICINE

## 2018-11-29 PROCEDURE — 85025 COMPLETE CBC W/AUTO DIFF WBC: CPT | Performed by: FAMILY MEDICINE

## 2018-11-29 PROCEDURE — 36415 COLL VENOUS BLD VENIPUNCTURE: CPT | Performed by: FAMILY MEDICINE

## 2018-11-29 PROCEDURE — 90670 PCV13 VACCINE IM: CPT | Performed by: FAMILY MEDICINE

## 2018-11-29 PROCEDURE — 99495 TRANSJ CARE MGMT MOD F2F 14D: CPT | Mod: 25 | Performed by: FAMILY MEDICINE

## 2018-11-29 RX ORDER — METAXALONE 800 MG/1
800 TABLET ORAL 3 TIMES DAILY PRN
Qty: 30 TABLET | Refills: 1 | Status: SHIPPED | OUTPATIENT
Start: 2018-11-29 | End: 2019-01-21

## 2018-11-29 NOTE — PROGRESS NOTES
SUBJECTIVE:                                                      Patient presents for Hospital Followup Visit:    Hospital:  [facility]      Date of Admission: 11/20/18  Date of Discharge: 11/22/18  Reason(s) for Admission: episodes of melanotic stool, gastric ulcer at G-J junction            Problems taking medications regularly:  None       Medication changes since discharge: Started Flexeril 5 mg PRN, Prilosec 40 mg BID       Problems adhering to non-medication therapy:  None    Summary of hospitalization:  [facility] discharge summary reviewed  Diagnostic Tests/Treatments reviewed.  Follow up needed: iron infusions (scheduled), and recheck of Hgb. Also scheduled for EGD in 8 weeks to check on healing of ulcer.  Other Healthcare Providers Involved in Patient s Care:         None  Update since discharge: stable. Melanotic stools have resolved, he is still feeling quite fatigued. He gets lightheaded as well. He tries to hydrate well. No actual syncope. Stools are still loose, but light in color without any blood or melena. No fever, sweats, chills. He has not tried to increase his fluid consumption, mostly drinking coffee and soda.     He reports that he had muscle relaxant to use for neck pain. He has osteoarthritis of the spine. He does no want to use anything addicting, but wonders what could be helpful for his chronic pain. The Flexeril made him very sleepy.    ROS:  Constitutional, HEENT, cardiovascular, pulmonary, gi and gu systems are negative, except as otherwise noted.    OBJECTIVE:                                                      GENERAL APPEARANCE: healthy, alert and no distress  EYES: pale conjunctiva of inner lids  NECK: no adenopathy, no asymmetry, masses, or scars and thyroid normal to palpation  RESP: lungs clear to auscultation - no rales, rhonchi or wheezes  CV: regular rates and rhythm, normal S1 S2, no S3 or S4 and no murmur, click or rub  ABDOMEN: soft, nontender,  without hepatosplenomegaly or masses and bowel sounds normal  SKIN: no suspicious lesions or rashes  PSYCH: mentation appears normal and affect normal/bright    ASSESSMENT/PLAN:                                                      Assessment/Plan:  Wesley was seen today for hospital f/u, recheck medication and consult.    Diagnoses and all orders for this visit:    Need for pneumococcal vaccination  -     PNEUMOCOCCAL CONJ VACCINE 13 VALENT IM  -     ADMIN PNEUMOCOCCAL VACCINE    Special screening for malignant neoplasms, colon  -     GASTROENTEROLOGY ADULT REF PROCEDURE ONLY    Primary osteoarthritis involving multiple joints  -     metaxalone (SKELAXIN) 800 MG tablet; Take 1 tablet (800 mg) by mouth 3 times daily as needed for moderate pain  Try to avoid any addictive substances and change from Flexeril to Skelaxin in hopes of it having less sedation.     Gastrointestinal hemorrhage associated with gastric ulcer  -     CBC with Diff/Plt ([facility])  Reassured by improved blood counts, agree with plan to have iron infusion as his prior gastric bypass puts him at risk of poor gastric absorption.     Ailyn Gutierrez MD  [facility]  [phone]            Post Discharge Medication Reconciliation: discharge medications reconciled, continue medications without change.  Issues to address: No issues identified.  Plan of care communicated with patient      Type of Medical Decision Making Face-to-Face Visit within 7 Days Face-to-Face Visit within 14 days   Moderate Complexity [id number]   High Complexity [id number]

## 2018-11-29 NOTE — MR AVS SNAPSHOT
After Visit Summary   11/29/2018    Wesley Art    MRN: [id number]           Patient Information     Date Of Birth          1955        Visit Information        Provider Department      11/29/2018 2:45 PM Ailyn Gutierrez MD [facility]        Today's Diagnoses     Need for pneumococcal vaccination    -  1    Special screening for malignant neoplasms, colon        Primary osteoarthritis involving multiple joints        Gastrointestinal hemorrhage associated with gastric ulcer           Follow-ups after your visit        Additional Services     GASTROENTEROLOGY ADULT REF PROCEDURE ONLY       Last Lab Result: Creatinine (mg/dL)       Date                     Value                 11/22/2018               0.86             ----------  There is no height or weight on file to calculate BMI.     Needed:  No  Language:  English    Patient will be contacted to schedule procedure.     Please be aware that coverage of these services is subject to the terms and limitations of your health insurance plan.  Call member services at your health plan with any benefit or coverage questions.  Any procedures must be performed at a [facility] facility OR coordinated by your clinic's referral office.    Please bring the following with you to your appointment:    (1) Any X-Rays, CTs or MRIs which have been performed.  Contact the facility where they were done to arrange for  prior to your scheduled appointment.    (2) List of current medications   (3) This referral request   (4) Any documents/labs given to you for this referral                  Who to contact     If you have questions or need follow up information about today's clinic visit or your schedule please contact [facility] directly at [phone].  Normal or non-critical lab and imaging results will be communicated to you by MyChart, letter or phone within 4 business days after the clinic has received the  "results. If you do not hear from us within 7 days, please contact the clinic through [employer] or phone. If you have a critical or abnormal lab result, we will notify you by phone as soon as possible.  Submit refill requests through [employer] or call your pharmacy and they will forward the refill request to us. Please allow 3 business days for your refill to be completed.          Additional Information About Your Visit        [employer] Information     [employer] lets you send messages to your doctor, view your test results, renew your prescriptions, schedule appointments and more. To sign up, go to [email or web address] . Click on \"Log in\" on the left side of the screen, which will take you to the Welcome page. Then click on \"Sign up Now\" on the right side of the page.     You will be asked to enter the access code listed below, as well as some personal information. Please follow the directions to create your username and password.     Your access code is: [email or web address]  Expires: 2019  2:32 PM     Your access code will  in 90 days. If you need help or a new code, please call your [place] clinic or [phone].        Care EveryWhere ID     This is your Care EveryWhere ID. This could be used by other organizations to access your [place] medical records  [id number]        Your Vitals Were     Pulse Respirations Pulse Oximetry BMI (Body Mass Index)          79 16 98% 35.49 kg/m2         Blood Pressure from Last 3 Encounters:   18 112/70   18 117/66   18 140/88    Weight from Last 3 Encounters:   18 125.4 kg (276 lb 6.4 oz)   18 125.9 kg (277 lb 9 oz)   18 122.6 kg (270 lb 3.2 oz)              We Performed the Following     ADMIN PNEUMOCOCCAL VACCINE     CBC with Diff/Plt ([facility])     GASTROENTEROLOGY ADULT REF PROCEDURE ONLY     PNEUMOCOCCAL CONJ VACCINE 13 VALENT IM          Today's Medication Changes          These changes are accurate as of 18  5:44 PM.  If you have " any questions, ask your nurse or doctor.               Start taking these medicines.        Dose/Directions    metaxalone 800 MG tablet   Commonly known as:  SKELAXIN   Used for:  Primary osteoarthritis involving multiple joints   Started by:  Ailyn Gutierrez MD        Dose:  800 mg   Take 1 tablet (800 mg) by mouth 3 times daily as needed for moderate pain   Quantity:  30 tablet   Refills:  1            Where to get your medicines      These medications were sent to [facility] - [address] [address]     Phone:  [phone]     metaxalone 800 MG tablet                Primary Care Provider Office Phone # Fax #    Ailyn Gutierrez [phone] [address] [place] [address]        Equal Access to Services     [facility]: Hadii daysi romo hadasho Soomaali, waaxda luqadaha, qaybta kaalmada adeegyada, tye herrera . So [facility] [phone].    ATENCIÓN: Si habla español, tiene a salinas disposición servicios gratuitos de asistencia lingüística. [facility] [phone].    We comply with applicable federal civil rights laws and [place] laws. We do not discriminate on the basis of race, color, national origin, age, disability, sex, sexual orientation, or gender identity.            Thank you!     Thank you for choosing [facility]  for your care. Our goal is always to provide you with excellent care. Hearing back from our patients is one way we can continue to improve our services. Please take a few minutes to complete the written survey that you may receive in the mail after your visit with us. Thank you!             Your Updated Medication List - Protect others around you: Learn how to safely use, store and throw away your medicines at www.disposemymeds.org.          This list is accurate as of 11/29/18  5:44 PM.  Always use your most recent med list.                    Brand Name Dispense Instructions for use Diagnosis    B-12 (Methylcobalamin) 1000 MCG Subl     90 tablet    Place 1 tablet under the tongue every other day    H/O gastric bypass, B12 deficiency       [place] 3 MG Caps      Take 3 mg by mouth 2 times daily        buPROPion 100 MG 12 hr tablet    WELLBUTRIN SR    180 tablet    TAKE 1 TABLET TWICE A DAY    Recurrent major depressive disorder, in partial remission (H)       cyclobenzaprine 5 MG tablet    FLEXERIL    20 tablet    Take 0.5-1 tablets (2.5-5 mg) by mouth 3 times daily as needed for muscle spasms    Neck pain       doxazosin 4 MG tablet    CARDURA     Take 4 mg by mouth 2 times daily        finasteride 5 MG tablet    PROSCAR    90 tablet    Take 1 tablet (5 mg) by mouth daily    Benign prostatic hyperplasia with urinary frequency       metaxalone 800 MG tablet    SKELAXIN    30 tablet    Take 1 tablet (800 mg) by mouth 3 times daily as needed for moderate pain    Primary osteoarthritis involving multiple joints       MULTI COMPLETE PO      Take 1 tablet by mouth daily        omeprazole 40 MG DR capsule    priLOSEC    60 capsule    Take 1 capsule (40 mg) by mouth 2 times daily (before meals) Take 30-60 minutes before a meal.    Upper GI bleed       sertraline 100 MG tablet    ZOLOFT    90 tablet    Take 1 tablet (100 mg) by mouth daily    Recurrent major depressive disorder, in partial remission (H), LU (generalized anxiety disorder)       VITAMIN C PO      Take 1,000 mg by mouth daily        vitamin D3 2000 units tablet    CHOLECALCIFEROL    100 tablet    Take 5,000 Units by mouth daily    Low vitamin D level

## 2018-12-04 ENCOUNTER — TELEPHONE (OUTPATIENT)
Dept: SURGERY | Facility: CLINIC | Age: 63
End: 2018-12-04

## 2018-12-04 NOTE — TELEPHONE ENCOUNTER
Reason for call:  Other   Patient called regarding (reason for call): Colonoscopy Prep  Additional comments: He stated that his Dr thinks there is a different prep for his colonoscopy for patients who has has gastric bypass. His colonoscopy is 1/14/2019. If there is, patient wants a call back.    Phone number to reach patient:  Home number on file [phone] (home)    Best Time:  Yes    Can we leave a detailed message on this number?  YES     Have you had Bariatric surgery in the past?  YES    If No, disregard and delete the following questions.       If YES, when?  2005    What hospital? [facility]                                      What was the name of the surgery? RNY      Name of surgeon?  Dr. Melissa

## 2018-12-04 NOTE — TELEPHONE ENCOUNTER
Patient had gastric bypass 12/27/05.  Returned call re: colonoscopy prep.    Okay to do usual colon prep at 13 years PO.    Patient reports:  Over Thanksgiving head gj bleeding ulcer.  Last one was 10 years ago.  Planning follow up EGD and colonoscopy at same time.    Reports had been on omeprazole X 13 years and recently gone off.  Got back on NSAID's for neck pain.  Felt thought to have produced ulcer.    Now on Omeprazole  40 mg BID X 1 mo, then back down to 40 mg daily.  Also receiving iron infusion last week and will have again this week.  On 2 multivits with iron.  Recommended he discuss taking extra iron with GI.     Also states he has gained weight - due to job change and more snacks available.  Wants to do something about weight gain.  Encouraged patient contact clinic to re: re-establishing care.  Also informed patient that weight loss meds may be an option after discussing with PA and RD.  Patient stated he is not interested in revision.   Given number to call to re-establish care.  Patient stated he would do so after iron infusions complete.  Ellen Melendrez, MS, RD, RN

## 2018-12-07 ENCOUNTER — TRANSFERRED RECORDS (OUTPATIENT)
Dept: FAMILY MEDICINE | Facility: CLINIC | Age: 63
End: 2018-12-07

## 2018-12-12 ENCOUNTER — TELEPHONE (OUTPATIENT)
Dept: FAMILY MEDICINE | Facility: CLINIC | Age: 63
End: 2018-12-12

## 2018-12-12 DIAGNOSIS — M79.605 LOW BACK PAIN RADIATING TO LEFT LEG: Primary | ICD-10-CM

## 2018-12-12 DIAGNOSIS — M54.50 LOW BACK PAIN RADIATING TO LEFT LEG: Primary | ICD-10-CM

## 2018-12-12 RX ORDER — TRAMADOL HYDROCHLORIDE 50 MG/1
50-100 TABLET ORAL EVERY 6 HOURS PRN
Qty: 40 TABLET | Refills: 0 | COMMUNITY
Start: 2018-12-12 | End: 2018-12-26

## 2018-12-20 DIAGNOSIS — M54.50 LOW BACK PAIN RADIATING TO LEFT LEG: ICD-10-CM

## 2018-12-20 DIAGNOSIS — M79.605 LOW BACK PAIN RADIATING TO LEFT LEG: ICD-10-CM

## 2018-12-20 NOTE — PROGRESS NOTES
[place] [facility] Initial Evaluation  Subjective:  HPI                    Objective:  System    Physical Exam    General     [facility]: Physical Therapy Initial Evaluation   Dec 21, 2018    Subjective:   Chief Complaint:    Pain: Low back pain, left much more than right. Pain in to the left hip.    Numbness/Tingling: Into the left thigh/groin   Weakness: None   Stiffness: None  New/Recurrent/Chronic: Chronic/Recurrent  DOI/onset: 12/10/2018 was the most recent flare-up   Referral Date: 12/12/2018 - Dr. Ailyn Gutierrez  Mechanism of onset: Started a short while after a hospitalization. No specific mechanism  PMH/surgical history/trauma:    - Depression   - HTN   - Overweight   - Severe headaches  General health as reported by patient: Good    Medications: Pain, Prostate, Anti-depressants, Percocet (4 daily)  Occupation: Administration Job duties: prolonged sitting, repetitive tasks, computer work,  Previous Treatment (Effect): Heat/ice (helpful), muscle relaxers (seem to help), previous episodes - chiropractic (helpful),   Imaging: None  AM/PM: more sore in the morning, then better once he loosens up   Quality of Pain: sharp with movement  Pain: 3/10 at present, 1/10 at best, 7/10 at worst  Worse: sitting still once comfortable, sitting in a reclined with one leg up, standing, getting out of bed  Better: bending forward,   Progression of Symptoms since onset: better   Hx of Falls: None  Sleeping: Minimal disturbance   Current Functional Status: standing - 10 minutes ; walking - increased symptoms in just going to get the mail from the mailbox.    Previous Functional Status: walking - could walk 0.25 miles before symptoms started on treadmill at gym, no issues on a bike.   Current HEP/exercise regimen: gym membership (has not gone for about a month because of health issues)  Transportation to Therapy: Independent with transportation  Live with Others: Lives alone  Red Flags:    - Patient denies the following: Pain with Cough / Sneeze / Laughing ; Night Pain ; Weakness ; Chest Pain ; Unexplained Weight Loss ;   - Patient reports the following: Numbness/Tingling ; Change in Bowel or Bladder (not solid since GI bleed before Thanksgiving) ;     Patient's Goal(s): Be able to walk, move, and get rid of pain.       Objective:    Spinal Standing Posture: WNL    Gait: mild bilateral hip drop, slow gait which patient states is due to knee pain.     AROM: (Major, Moderate, Minimal or Nil loss)  Movement Loss Yash Mod Min Nil Pain   Flexion   x x Feels good   Extension  x   cc   Side Gliding L    x Stiff/sore   Side Gliding R    x Stiff/sore       Neurological:    Motor Deficit:  Myotomes R L   L1-2 (hip flexion) 5 5   L3 (knee extension) 5 5   L4 (ankle DF) 5 5   L5 (g. toe ext) 5 5   S1 (knee flex) 5 5     Reflexes:    R L   Patellar 2+ 2+   Achilles 2+ 2+     Dural Signs:   R L   Slump (-) (+)       Repeated movement testing:   (During: produces, abolishes, increases, decreases, no effect, centralizing, peripheralizing; After: better, worse, no better, no worse, no effect, centralized, peripheralized)    Pre-test Symptoms Lyin/10    Symptoms During Symptoms After ROM increased ROM decreased No Effect   JOSE ANTONIO better same      Rep JOSE ANTONIO worse better      Prone Lying better same      ANDRES worse same      EIL worse same      Rep EIL worse worse        Lumbar Mobility/Spring Testing: Pain with spring testing at L4    Palpation: Mild tenderness to palpation at SIJ bilaterally    Other: Patient became dizzy with repeated lumbar flexion in sitting.      Assessment/Plan:    Patient is a 63 year old male with lumbar and left radiating complaints.    Patient has the following significant findings with corresponding treatment plan.                Referring Diagnosis: Low back pain radiating to left leg   PT Diagnosis: Bilateral Low Back Pain with Left-sided Sciatica  Pain -  hot/cold therapy, manual therapy,  splint/taping/bracing/orthotics, self management, education, directional preference exercise and home program  Decreased ROM/flexibility - manual therapy, therapeutic exercise, therapeutic activity and home program  Decreased joint mobility - manual therapy, therapeutic exercise, therapeutic activity and home program  Impaired gait - gait training and home program  Decreased function - therapeutic activities and home program      Therapy Evaluation Codes:   1) History comprised of:   Personal factors that impact the plan of care:      Living environment and Overall behavior pattern.    Comorbidity factors that impact the plan of care are:      History of GI Bleeding.     Medications impacting care: Pain.  2) Examination of Body Systems comprised of:   Body structures and functions that impact the plan of care:      Hip and Lumbar spine.   Activity limitations that impact the plan of care are:      Standing and Walking.  3) Clinical presentation characteristics are:   Evolving/Changing.  4) Decision-Making    Moderate complexity using standardized patient assessment instrument and/or measureable assessment of functional outcome.  Cumulative Therapy Evaluation is: Moderate complexity.    Previous and current functional limitations:  (See Goal Flow Sheet for this information)    Short term and Long term goals: (See Goal Flow Sheet for this information)     Communication ability:  Patient appears to be able to clearly communicate and understand verbal and written communication and follow directions correctly.  Treatment Explanation - The following has been discussed with the patient:   RX ordered/plan of care  Anticipated outcomes  Possible risks and side effects  This patient would benefit from PT intervention to resume normal activities.   Rehab potential is fair.    Frequency:  1 X week, once daily  Duration:  for 8 weeks  Discharge Plan:  Achieve all LTG.  Independent in home treatment program.  Reach maximal  therapeutic benefit.    Please refer to the daily flowsheet for treatment today, total treatment time and time spent performing 1:1 timed codes.

## 2018-12-21 ENCOUNTER — THERAPY VISIT (OUTPATIENT)
Dept: PHYSICAL THERAPY | Facility: CLINIC | Age: 63
End: 2018-12-21
Payer: COMMERCIAL

## 2018-12-21 DIAGNOSIS — M79.605 LOW BACK PAIN RADIATING TO LEFT LEG: ICD-10-CM

## 2018-12-21 DIAGNOSIS — M54.50 LOW BACK PAIN RADIATING TO LEFT LEG: ICD-10-CM

## 2018-12-21 DIAGNOSIS — M54.42 BILATERAL LOW BACK PAIN WITH LEFT-SIDED SCIATICA: ICD-10-CM

## 2018-12-21 PROCEDURE — 97110 THERAPEUTIC EXERCISES: CPT | Mod: GP | Performed by: PHYSICAL THERAPIST

## 2018-12-21 PROCEDURE — G8978 MOBILITY CURRENT STATUS: HCPCS | Mod: GP | Performed by: PHYSICAL THERAPIST

## 2018-12-21 PROCEDURE — 97162 PT EVAL MOD COMPLEX 30 MIN: CPT | Mod: GP | Performed by: PHYSICAL THERAPIST

## 2018-12-21 PROCEDURE — G8979 MOBILITY GOAL STATUS: HCPCS | Mod: GP | Performed by: PHYSICAL THERAPIST

## 2018-12-21 RX ORDER — TRAMADOL HYDROCHLORIDE 50 MG/1
TABLET ORAL
Qty: 40 TABLET | Refills: 0 | OUTPATIENT
Start: 2018-12-21

## 2018-12-21 NOTE — TELEPHONE ENCOUNTER
Please call, he just had a refill. We need to know how he is taking this and if he had a change in his pain.

## 2018-12-23 PROBLEM — M54.42 BILATERAL LOW BACK PAIN WITH LEFT-SIDED SCIATICA: Status: ACTIVE | Noted: 2018-12-23

## 2018-12-26 ENCOUNTER — TELEPHONE (OUTPATIENT)
Dept: FAMILY MEDICINE | Facility: CLINIC | Age: 63
End: 2018-12-26

## 2018-12-26 RX ORDER — TRAMADOL HYDROCHLORIDE 50 MG/1
50-100 TABLET ORAL EVERY 6 HOURS PRN
Qty: 40 TABLET | Refills: 0 | COMMUNITY
Start: 2018-12-26 | End: 2019-01-21

## 2018-12-26 NOTE — TELEPHONE ENCOUNTER
Received fax from [employer] on 12/3 requesting PA for patient's rx for Metaxalone tabs. Submitted PA through covermymeds.  Received approval with approval dates 11/03/2018-12/03/2019.  Called [facility] to inform them of approval.

## 2019-01-04 DIAGNOSIS — K92.2 UPPER GI BLEED: ICD-10-CM

## 2019-01-05 RX ORDER — OMEPRAZOLE 40 MG/1
CAPSULE, DELAYED RELEASE ORAL
Qty: 60 CAPSULE | Refills: 0 | Status: SHIPPED | OUTPATIENT
Start: 2019-01-05 | End: 2019-12-06

## 2019-01-21 ENCOUNTER — OFFICE VISIT (OUTPATIENT)
Dept: FAMILY MEDICINE | Facility: CLINIC | Age: 64
End: 2019-01-21

## 2019-01-21 VITALS
HEART RATE: 63 BPM | WEIGHT: 278 LBS | BODY MASS INDEX: 35.69 KG/M2 | RESPIRATION RATE: 16 BRPM | DIASTOLIC BLOOD PRESSURE: 90 MMHG | SYSTOLIC BLOOD PRESSURE: 154 MMHG | OXYGEN SATURATION: 98 %

## 2019-01-21 DIAGNOSIS — E66.01 MORBID OBESITY (H): ICD-10-CM

## 2019-01-21 DIAGNOSIS — R10.9 FLANK PAIN: ICD-10-CM

## 2019-01-21 DIAGNOSIS — M79.605 LOW BACK PAIN RADIATING TO LEFT LEG: ICD-10-CM

## 2019-01-21 DIAGNOSIS — R31.29 MICROSCOPIC HEMATURIA: ICD-10-CM

## 2019-01-21 DIAGNOSIS — M54.50 LOW BACK PAIN RADIATING TO LEFT LEG: ICD-10-CM

## 2019-01-21 DIAGNOSIS — F33.41 RECURRENT MAJOR DEPRESSIVE DISORDER, IN PARTIAL REMISSION (H): ICD-10-CM

## 2019-01-21 LAB
BACTERIA URINE: ABNORMAL
BILIRUB UR QL STRIP: 0
BLOOD URINE DIP: ABNORMAL
CASTS/LPF: 0
COLOR UR: YELLOW
CRYSTAL URINE: 0
EPITHELIAL CELLS - QUEST: ABNORMAL
GLUCOSE UR STRIP-MCNC: 0 MG/DL
KETONES UR QL STRIP: 0
LEUKOCYTE ESTERASE URINE DIP: ABNORMAL
MUCOUS URINE: 0
NITRITE UR QL STRIP: ABNORMAL
PH UR STRIP: 5.5 PH (ref 5–9)
PROT UR QL: 0 MG/DL (ref ?–0.01)
RBC URINE: ABNORMAL (ref 0–3)
SP GR UR STRIP: 1.01 (ref 1–1.02)
UROBILINOGEN UR QL STRIP: 0.2 EU/DL (ref 0.2–1)
WBC URINE: ABNORMAL (ref 0–3)

## 2019-01-21 PROCEDURE — 99214 OFFICE O/P EST MOD 30 MIN: CPT | Performed by: FAMILY MEDICINE

## 2019-01-21 PROCEDURE — 71101 X-RAY EXAM UNILAT RIBS/CHEST: CPT | Performed by: FAMILY MEDICINE

## 2019-01-21 PROCEDURE — 81003 URINALYSIS AUTO W/O SCOPE: CPT | Performed by: FAMILY MEDICINE

## 2019-01-21 RX ORDER — TRAMADOL HYDROCHLORIDE 50 MG/1
50-100 TABLET ORAL EVERY 6 HOURS PRN
Qty: 40 TABLET | Refills: 0 | Status: SHIPPED | OUTPATIENT
Start: 2019-01-21 | End: 2019-03-12

## 2019-01-21 RX ORDER — METAXALONE 800 MG/1
800 TABLET ORAL 3 TIMES DAILY PRN
Qty: 30 TABLET | Refills: 1 | Status: SHIPPED | OUTPATIENT
Start: 2019-01-21 | End: 2019-07-18

## 2019-01-21 NOTE — PROGRESS NOTES
Problem(s) Oriented visit        SUBJECTIVE:                                                    Wesley Art is a 63 year old male who presents to clinic today for right sided rib pain. One week ago he was prepping for colonoscopy and fainted landing in his tub. No head injury. Hurts with any position change. He is able to take a reasonably deep breath and he does not feel short of breath.       Problem list, Medication list, Allergies, and Medical/Social/Surgical histories reviewed in EPIC and updated as appropriate.   Additional history: as documented    ROS:  5 point ROS completed and negative except noted above, including Gen, CV, Resp, GI, MS      Histories:   Patient Active Problem List   Diagnosis     Major depression in partial remission (H)     Primary osteoarthritis involving multiple joints     Benign nodular prostatic hyperplasia with lower urinary tract symptoms     Glucose intolerance (impaired glucose tolerance)     LU (generalized anxiety disorder)     Morbid obesity (H)     Melena     GIB (gastrointestinal bleeding)     Anastomotic ulcer     Bilateral low back pain with left-sided sciatica     Past Surgical History:   Procedure Laterality Date     APPENDECTOMY  child     CHOLECYSTECTOMY, LAPOROSCOPIC  1998    Cholecystectomy, Laparoscopic     LAPAROSCOPIC REVISION GASTROPLASTY TO MACIEJ-EN-Y  2006     VASECTOMY  1980       Social History     Tobacco Use     Smoking status: Never Smoker     Smokeless tobacco: Never Used   Substance Use Topics     Alcohol use: No     Alcohol/week: 0.0 oz     Comment: sober since 11/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 Father            OBJECTIVE:                                                    /90   Pulse 63   Resp 16   Wt 126.1 kg (278 lb)   SpO2 98%   BMI 35.69 kg/m    Body mass index is 35.69 kg/m .   GENERAL APPEARANCE: Alert, no acute distress  RESP: lungs clear to auscultation   CV: normal rate, regular  rhythm, no murmur or gallop  MS: point tenderness is noted at the right posterior lower ribs in two particular areas. No abdominal pain.   NEURO: Alert, oriented, speech and mentation normal  PSYCH: mentation appears normal, affect and mood normal  Rib xray shows no apparent fracture, this is sent for    Labs Resulted Today:   Results for orders placed or performed in visit on 01/21/19   Urinalysis w/reflex protein, bili ([facility])   Result Value Ref Range    Color Urine Yellow     pH Urine 5.5 5 - 9 pH    Specific Gravity Urine 1.010 1.005 - 1.025    Protein Urine 0 0.01 mg/dL    Glucose Urine 0     Ketones Urine 0     Leukocyte Esterase Urine trace (A)     Blood Urine 2+ (A)     Nitrite Urine Neg NEG    Bilirubin Urine Dip 0     Urobilinogen Urine 0.2 0.2 - 1.0 EU/dL    WBC Urine 3-6 0 - 3    RBC Urine 9-11 0 - 3    Epithelial Cells Rare     Crystal Urine 0     Bacteria Urine Rare     Mucous Urine 0     Casts/LPF 0      ASSESSMENT/PLAN:                                                        Wesley was seen today for fall.    Diagnoses and all orders for this visit:    Flank pain  -     X-ray rt Rib unilat 3 vws + PA chest  -     Urinalysis w/reflex protein, bili ([facility])  -     Urine Culture  Routine ([facility])  -     Basic Metabolic Panel (8) ([facility])  -     metaxalone (SKELAXIN) 800 MG tablet; Take 1 tablet (800 mg) by mouth 3 times daily as needed for moderate pain  -     traMADol (ULTRAM) 50 MG tablet; Take 1-2 tablets ( mg) by mouth every 6 hours as needed for moderate to severe pain  Recommend alternate heat and ice to the back, use muscle relaxant as needed and tramadol for pain as needed. Need to evaluate further as to cause for hematuria, kidney contusion versus infection.    Microscopic hematuria  -     Urine Culture  Routine ([facility])  -     Basic Metabolic Panel (8) ([facility])  Verify normal kidney function in light of the fall which may be cause for contusion.    Low back pain radiating to left leg  -      traMADol (ULTRAM) 50 MG tablet; Take 1-2 tablets ( mg) by mouth every 6 hours as needed for moderate to severe pain    Morbid obesity (H)    Recurrent major depressive disorder, in partial remission (H)          There are no Patient Instructions on file for this visit.    The following health maintenance items are reviewed in Epic and correct as of today:  Health Maintenance   Topic Date Due     HIV SCREEN (SYSTEM ASSIGNED)  03/03/1971     ZOSTER IMMUNIZATION (1 of 2) 03/03/2003     AORTIC ANEURYSM SCREENING (SYSTEM ASSIGNED)  03/03/2018     PHQ-9 Q6 MONTHS  01/17/2019     FALL RISK ASSESSMENT  07/17/2019     PNEUMOVAX IMMUNIZATION 65+ LOW/MEDIUM RISK (2 of 2 - PPSV23) 11/29/2019     LIPID SCREEN Q5 YR MALE (SYSTEM ASSIGNED)  07/17/2023     ADVANCE DIRECTIVE PLANNING Q5 YRS  11/21/2023     COLONOSCOPY Q5 YR  01/14/2024     DTAP/TDAP/TD IMMUNIZATION (3 - Td) 03/04/2024     DEPRESSION ACTION PLAN  Completed     INFLUENZA VACCINE  Completed     HEPATITIS C SCREENING  Completed     IPV IMMUNIZATION  Aged Out     MENINGITIS IMMUNIZATION  Aged Out       Ailyn Gutierrez MD  [facility]  Family Practice  [facility]  [phone]    For any issues my office # is [phone]

## 2019-01-21 NOTE — LETTER
[facility] [address] [place]  [address]  Phone: [phone]    January 24, 2019      Wesley Art  [address] [place] [address]              Dear Wesley,    The results from your recent visit showed Urine grew an insignificant amount of bacteria and does not require an antibiotic.   Normal kidney function and electrolytes        Sincerely,     Ailyn Gutierrez M.D.    Results for orders placed or performed in visit on 01/21/19   Urinalysis w/reflex protein, bili ([facility])   Result Value Ref Range    Color Urine Yellow     pH Urine 5.5 5 - 9 pH    Specific Gravity Urine 1.010 1.005 - 1.025    Protein Urine 0 0.01 mg/dL    Glucose Urine 0     Ketones Urine 0     Leukocyte Esterase Urine trace (A)     Blood Urine 2+ (A)     Nitrite Urine Neg NEG    Bilirubin Urine Dip 0     Urobilinogen Urine 0.2 0.2 - 1.0 EU/dL    WBC Urine 3-6 0 - 3    RBC Urine 9-11 0 - 3    Epithelial Cells Rare     Crystal Urine 0     Bacteria Urine Rare     Mucous Urine 0     Casts/LPF 0    Urine Culture  Routine ([facility])   Result Value Ref Range    Urine Culture Final report (A)     Result 1 Comment (A)     Narrative    Performed at:  01 - [facility] [address], [place], [place]  [id number]  : Dereck Covarrubias MD, Phone:  [phone]   Basic Metabolic Panel (8) ([facility])   Result Value Ref Range    Glucose 98 65 - 99 mg/dL    Urea Nitrogen 13 8 - 27 mg/dL    Creatinine 0.99 0.76 - 1.27 mg/dL    eGFR If NonAfricn Am 80 >59 mL/min/1.73    eGFR If Africn Am 92 >59 mL/min/1.73    BUN/Creatinine Ratio 13 10 - 24    Sodium 143 134 - 144 mmol/L    Potassium 4.5 3.5 - 5.2 mmol/L    Chloride 106 96 - 106 mmol/L    Total CO2 24 20 - 29 mmol/L    Calcium 9.1 8.6 - 10.2 mg/dL    Narrative    Performed at:  01 - [facility] [address] [place], [place]  [id number]  : Dereck Covarrubias MD, Phone:  [phone]

## 2019-01-22 LAB
BUN SERPL-MCNC: 13 MG/DL (ref 8–27)
BUN/CREATININE RATIO: 13 (ref 10–24)
CALCIUM SERPL-MCNC: 9.1 MG/DL (ref 8.6–10.2)
CHLORIDE SERPLBLD-SCNC: 106 MMOL/L (ref 96–106)
CREAT SERPL-MCNC: 0.99 MG/DL (ref 0.76–1.27)
EGFR IF AFRICN AM: 92 ML/MIN/1.73
EGFR IF NONAFRICN AM: 80 ML/MIN/1.73
GLUCOSE SERPL-MCNC: 98 MG/DL (ref 65–99)
POTASSIUM SERPL-SCNC: 4.5 MMOL/L (ref 3.5–5.2)
SODIUM SERPL-SCNC: 143 MMOL/L (ref 134–144)
TOTAL CO2: 24 MMOL/L (ref 20–29)

## 2019-01-23 LAB
Lab: ABNORMAL
URINE CULTURE: ABNORMAL

## 2019-02-14 PROBLEM — M54.42 BILATERAL LOW BACK PAIN WITH LEFT-SIDED SCIATICA: Status: RESOLVED | Noted: 2018-12-23 | Resolved: 2019-02-14

## 2019-02-14 NOTE — PROGRESS NOTES
DISCHARGE REPORT    Updated as of February 14, 2019.    Discharge report is from initial evaluation on Dec 21, 2018.       SUBJECTIVE  Subjective changes noted by patient: Patient has not returned since initial evaluation.   Changes in function:  Patient has not returned to clinic to assess.   Adverse reaction to treatment or activity: Patient has not returned to clinic to assess.     OBJECTIVE  Changes noted in objective findings:  Patient has failed to return to therapy so current objective findings are unknown.  Objective: See initial evaluation note.      ASSESSMENT/PLAN  STG/LTGs have been met or progress has been made towards goals:  Goal status unknown  Assessment of Progress: Patient has not returned to therapy.  Current status is unknown and discharge G code cannot be reported.  Vielka continues to require the following intervention to meet STG and LTG's:  Unknown, patient has not returned to therapy.     Recommendations:  No recommendations can accurately be made. Patient has failed to return to therapy.     Please refer to the daily flowsheet for treatment today, total treatment time and time spent performing 1:1 timed codes.

## 2019-02-18 ENCOUNTER — OFFICE VISIT (OUTPATIENT)
Dept: FAMILY MEDICINE | Facility: CLINIC | Age: 64
End: 2019-02-18

## 2019-02-18 VITALS
SYSTOLIC BLOOD PRESSURE: 132 MMHG | HEART RATE: 84 BPM | WEIGHT: 278 LBS | OXYGEN SATURATION: 96 % | RESPIRATION RATE: 16 BRPM | BODY MASS INDEX: 35.69 KG/M2 | DIASTOLIC BLOOD PRESSURE: 86 MMHG

## 2019-02-18 DIAGNOSIS — R31.9 HEMATURIA, UNSPECIFIED TYPE: Primary | ICD-10-CM

## 2019-02-18 LAB
% GRANULOCYTES: 70 % (ref 42.2–75.2)
BACTERIA URINE: 0
BILIRUB UR QL STRIP: 0
BLOOD URINE DIP: ABNORMAL
CASTS/LPF: 0
COLOR UR: ABNORMAL
CRYSTAL URINE: 0
EPITHELIAL CELLS - QUEST: ABNORMAL
GLUCOSE UR STRIP-MCNC: ABNORMAL MG/DL
HCT VFR BLD AUTO: 41.4 % (ref 39–51)
HEMOGLOBIN: 13.4 G/DL (ref 13.4–17.5)
KETONES UR QL STRIP: 0
LEUKOCYTE ESTERASE URINE DIP: ABNORMAL
LYMPHOCYTES NFR BLD AUTO: 23.3 % (ref 20.5–51.1)
MCH RBC QN AUTO: 30.4 PG (ref 27–31)
MCHC RBC AUTO-ENTMCNC: 32.5 G/DL (ref 33–37)
MCV RBC AUTO: 93.6 FL (ref 80–100)
MONOCYTES NFR BLD AUTO: 6.7 % (ref 1.7–9.3)
MUCOUS URINE: 0
NITRITE UR QL STRIP: ABNORMAL
PH UR STRIP: 6 PH (ref 5–9)
PLATELET # BLD AUTO: 268 K/UL (ref 140–450)
PROT UR QL: ABNORMAL MG/DL (ref ?–0.01)
RBC # BLD AUTO: 4.43 X10/CMM (ref 4.2–5.9)
RBC URINE: ABNORMAL (ref 0–3)
SP GR UR STRIP: 1.02 (ref 1–1.02)
UROBILINOGEN UR QL STRIP: 0.2 EU/DL (ref 0.2–1)
WBC # BLD AUTO: 8.5 X10/CMM (ref 3.8–11)
WBC URINE: ABNORMAL (ref 0–3)

## 2019-02-18 PROCEDURE — 85025 COMPLETE CBC W/AUTO DIFF WBC: CPT | Performed by: FAMILY MEDICINE

## 2019-02-18 PROCEDURE — 36415 COLL VENOUS BLD VENIPUNCTURE: CPT | Performed by: FAMILY MEDICINE

## 2019-02-18 PROCEDURE — 81003 URINALYSIS AUTO W/O SCOPE: CPT | Performed by: FAMILY MEDICINE

## 2019-02-18 PROCEDURE — 99214 OFFICE O/P EST MOD 30 MIN: CPT | Performed by: FAMILY MEDICINE

## 2019-02-18 RX ORDER — FERROUS SULFATE 325(65) MG
325 TABLET ORAL
COMMUNITY
End: 2019-11-20

## 2019-02-18 NOTE — PROGRESS NOTES
SUBJECTIVE:   Wesley Art is a 63 year old male who presents to clinic today for the following health issues:     In January and seen on 1/21/19 Fall after Mag Citrate and hit tub. Came in one week later and trace blood in urine.    No h/o kidney stone for self. No CVA pain. No fever or chills. No Nausea or vomiting. Right side pain getting better after the fall above.  Pain right side in am when awakens and occasional. Non tender on exam.  Back and neck issues. NSAID off, ASA off. 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     Concern - Blood in Urine  Clots no  Pink in water at the end of the stream  Creatinine   Date Value Ref Range Status   01/21/2019 0.99 0.76 - 1.27 mg/dL Final       Onset: 2 weeks    Description:   Notice some light blood in underwear by penis. No rash    Intensity: moderate    Progression of Symptoms:  same and intermittent    Accompanying Signs & Symptoms:  urgency    Previous history of similar problem:   Had fell an right side and brusied kidney, and had blood in urine then    Precipitating factors:   Worsened by: none    Alleviating factors:  Improved by: none    No sexual activity  Discharge no    Therapies Tried and outcome: none    [place] OTC for arthritis    He has seen Dr Fletcher urology in the past and elects to schedule with him.  In the mean time he will increase his water intake  Report any fever or increasing pain  We discussed pro/con of starting an antibiotic. He elected to wait for the UCx.          Problem list and histories reviewed & adjusted, as indicated.  Additional history: as documented    Patient Active Problem List   Diagnosis     Major depression in partial remission (H)     Primary osteoarthritis  involving multiple joints     Benign nodular prostatic hyperplasia with lower urinary tract symptoms     Glucose intolerance (impaired glucose tolerance)     LU (generalized anxiety disorder)     Morbid obesity (H)     Melena     GIB (gastrointestinal bleeding)     Anastomotic ulcer     Past Surgical History:   Procedure Laterality Date     APPENDECTOMY  child     CHOLECYSTECTOMY, LAPOROSCOPIC  1998    Cholecystectomy, Laparoscopic     LAPAROSCOPIC REVISION GASTROPLASTY TO MACIEJ-EN-Y  2006     VASECTOMY  1980       Social History     Tobacco Use     Smoking status: Never Smoker     Smokeless tobacco: Never Used   Substance Use Topics     Alcohol use: No     Alcohol/week: 0.0 oz     Comment: sober since 11/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 Father          Current Outpatient Medications   Medication Sig Dispense Refill     Ascorbic Acid (VITAMIN C PO) Take 1,000 mg by mouth daily        B-12, Methylcobalamin, 1000 MCG SUBL Place 1 tablet under the tongue every other day 90 tablet 1     Boron 3 MG CAPS Take 3 mg by mouth 2 times daily       buPROPion (WELLBUTRIN SR) 100 MG 12 hr tablet TAKE 1 TABLET TWICE A  tablet 3     Cholecalciferol (VITAMIN D) 2000 UNITS tablet Take 5,000 Units by mouth daily  100 tablet 3     cyclobenzaprine (FLEXERIL) 5 MG tablet Take 0.5-1 tablets (2.5-5 mg) by mouth 3 times daily as needed for muscle spasms 20 tablet 0     doxazosin (CARDURA) 4 MG tablet Take 4 mg by mouth 2 times daily       ferrous sulfate (FEROSUL) 325 (65 Fe) MG tablet Take 325 mg by mouth daily (with breakfast)       finasteride (PROSCAR) 5 MG tablet Take 1 tablet (5 mg) by mouth daily 90 tablet 3     Multiple Vitamins-Minerals (MULTI COMPLETE PO) Take 1 tablet by mouth daily        omeprazole (PRILOSEC) 40 MG DR capsule TAKE 1 CAPSULE TWICE A DAY 30 TO 60 MINUTES BEFORE MEALS 60 capsule 0     sertraline (ZOLOFT) 100 MG tablet Take 1 tablet (100 mg) by mouth daily 90 tablet 3      metaxalone (SKELAXIN) 800 MG tablet Take 1 tablet (800 mg) by mouth 3 times daily as needed for moderate pain (Patient not taking: Reported on 2/18/2019) 30 tablet 1     traMADol (ULTRAM) 50 MG tablet Take 1-2 tablets ( mg) by mouth every 6 hours as needed for moderate to severe pain (Patient not taking: Reported on 2/18/2019) 40 tablet 0     Allergies   Allergen Reactions     Magnesium Citrate Other (See Comments)     syncope     Recent Labs   Lab Test 01/21/19  1615 11/22/18  0735 11/21/18  0509 11/20/18  0838  07/17/18  1007 07/17/18  1006 07/12/17  1010  07/07/16  0851 06/12/15  0856   A1C  --   --   --   --   --  5.5  --  5.6  --  5.6 5.7*   LDL  --   --   --   --   --   --  103* 116*  --   --  93   HDL  --   --   --   --   --   --  56 65  --   --  75   TRIG  --   --   --   --   --   --  117 126  --   --  86   ALT  --   --   --  28  --   --  22 23  --   --  24   CR 0.99 0.86 0.83 1.00  --   --  1.15 0.92   < >  --  1.01   GFRESTIMATED  --  89 >90 75   < >  --   --   --   --   --   --    GFRESTBLACK  --  >90 >90 >90   < >  --   --   --   --   --   --    POTASSIUM 4.5 3.6 3.4 4.3  --   --  4.3 4.6  --   --  4.3    < > = values in this interval not displayed.      BP Readings from Last 3 Encounters:   02/18/19 132/86   01/21/19 154/90   11/29/18 112/70    Wt Readings from Last 3 Encounters:   02/18/19 126.1 kg (278 lb)   01/21/19 126.1 kg (278 lb)   11/29/18 125.4 kg (276 lb 6.4 oz)                  Labs reviewed in EPIC    Reviewed and updated as needed this visit by clinical staff       Reviewed and updated as needed this visit by Provider         ROS:  Constitutional, HEENT, cardiovascular, pulmonary, GI, , musculoskeletal, neuro, skin, endocrine and psych systems are negative, except as otherwise noted.    OBJECTIVE:     /86   Pulse 84   Resp 16   Wt 126.1 kg (278 lb)   SpO2 96%   BMI 35.69 kg/m    Body mass index is 35.69 kg/m .  GENERAL: healthy, alert and no distress  EYES: Eyes grossly  normal to inspection, PERRL and conjunctivae and sclerae normal  HENT: ear canals and TM's normal, nose and mouth without ulcers or lesions  NECK: no adenopathy, no asymmetry, masses, or scars and thyroid normal to palpation  RESP: lungs clear to auscultation - no rales, rhonchi or wheezes  CV: regular rate and rhythm, normal S1 S2, no S3 or S4, no murmur, click or rub, no peripheral edema and peripheral pulses strong  ABDOMEN: soft, nontender, no hepatosplenomegaly, no masses and bowel sounds normal   (male): normal male genitalia without lesions or urethral discharge, no hernia. No penis discharge. No fissure. No rash.  Rectal no gross blood. I cannot feel his prostate.  No CVA pain  MS: no gross musculoskeletal defects noted, no edema  SKIN: no suspicious lesions or rashes  NEURO: Normal strength and tone, mentation intact and speech normal  PSYCH: mentation appears normal, affect normal/bright  LYMPH: no cervical, supraclavicular, axillary, or inguinal adenopathy    Diagnostic Test Results:  Results for orders placed or performed in visit on 02/18/19   Urinalysis w/reflex protein, bili ([facility])   Result Value Ref Range    Color Urine Orange (A)     pH Urine 6.0 5 - 9 pH    Specific Gravity Urine 1.020 1.005 - 1.025    Protein Urine 2+ (A) 0.01 mg/dL    Glucose Urine trace (A)     Ketones Urine 0     Leukocyte Esterase Urine 2+ (A)     Blood Urine 3+ (A)     Nitrite Urine Neg NEG    Bilirubin Urine Dip 0     Urobilinogen Urine 0.2 0.2 - 1.0 EU/dL    WBC Urine packed (A) 0 - 3    RBC Urine packed (A) 0 - 3    Epithelial Cells 0-3     Crystal Urine 0     Bacteria Urine 0     Mucous Urine 0     Casts/LPF 0    CBC with Diff/Plt ([facility])   Result Value Ref Range    WBC x10/cmm 8.5 3.8 - 11.0 x10/cmm    % Lymphocytes 23.3 20.5 - 51.1 %    % Monocytes 6.7 1.7 - 9.3 %    % Granulocytes 70.0 42.2 - 75.2 %    RBC x10/cmm 4.43 4.2 - 5.9 x10/cmm    Hemoglobin 13.4 13.4 - 17.5 g/dl    Hematocrit 41.4 39 - 51 %    MCV 93.6 80 -  100 fL    MCH 30.4 27.0 - 31.0 pg    MCHC 32.5 (A) 33.0 - 37.0 g/dL    Platelet Count 268 140 - 450 K/uL       ASSESSMENT/PLAN:   ASSESSMENT / PLAN:  (R31.9) Hematuria, unspecified type  (primary encounter diagnosis)  Comment: reviewed UA with the patient and CBC. Will wait for the UC before Abx.  Plan: Urinalysis w/reflex protein, bili ([facility]), Urine         Culture  Routine ([facility]), CBC with Diff/Plt         ([facility]), PSA Serum ([facility]), Basic Metabolic         Panel (8) ([facility]), [facility]              Patient Instructions     Schedule with Dr Fletcher    Urine culture pending    Hematuria  Hemoglobin   Date Value Ref Range Status   02/18/2019 13.4 13.4 - 17.5 g/dl Final   11/29/2018 9.8 (A) 13.4 - 17.5 g/dl Final     Call if new issues    We discussed imaging and deferred    Drink lots of water    Get on pharmacy list for Shingrix          Michelle Gonzalez MD  [facility]

## 2019-02-18 NOTE — PATIENT INSTRUCTIONS
Schedule with Dr Fletcher    Urine culture pending    Hematuria  Hemoglobin   Date Value Ref Range Status   02/18/2019 13.4 13.4 - 17.5 g/dl Final   11/29/2018 9.8 (A) 13.4 - 17.5 g/dl Final     Call if new issues    We discussed imaging and deferred    Drink lots of water    Get on pharmacy list for Shingrix

## 2019-02-19 LAB
BUN SERPL-MCNC: 14 MG/DL (ref 8–27)
BUN/CREATININE RATIO: 14 (ref 10–24)
CALCIUM SERPL-MCNC: 9.1 MG/DL (ref 8.6–10.2)
CHLORIDE SERPLBLD-SCNC: 105 MMOL/L (ref 96–106)
CREAT SERPL-MCNC: 1.03 MG/DL (ref 0.76–1.27)
EGFR IF AFRICN AM: 88 ML/MIN/1.73
EGFR IF NONAFRICN AM: 76 ML/MIN/1.73
GLUCOSE SERPL-MCNC: 72 MG/DL (ref 65–99)
POTASSIUM SERPL-SCNC: 4.7 MMOL/L (ref 3.5–5.2)
PSA NG/ML: 0.2 NG/ML (ref 0–4)
SODIUM SERPL-SCNC: 143 MMOL/L (ref 134–144)
TOTAL CO2: 25 MMOL/L (ref 20–29)

## 2019-02-19 ASSESSMENT — ASTHMA QUESTIONNAIRES: ACT_TOTALSCORE: 11

## 2019-02-20 LAB
Lab: ABNORMAL
URINE CULTURE: ABNORMAL

## 2019-02-22 ENCOUNTER — TELEPHONE (OUTPATIENT)
Dept: FAMILY MEDICINE | Facility: CLINIC | Age: 64
End: 2019-02-22

## 2019-02-22 DIAGNOSIS — R31.9 HEMATURIA: Primary | ICD-10-CM

## 2019-02-22 RX ORDER — AMOXICILLIN 500 MG/1
500 CAPSULE ORAL 3 TIMES DAILY
Qty: 21 CAPSULE | Refills: 0 | Status: SHIPPED | OUTPATIENT
Start: 2019-02-22 | End: 2019-03-01

## 2019-02-22 NOTE — TELEPHONE ENCOUNTER
----- Message from Michelle Gonzalez MD sent at 2/22/2019  2:22 PM CST -----  Please send a letter with results.   PSA was normal.  BMP normal  Urine culture Beta hemolytic Strep B. He had h/o of hematuria and planned to see Dr Fletcher. Amoxicillin 500 mg po TID x 7 days is an option if he was not treated already by urology.  CBC was ok         Michelle Gonzalez MD

## 2019-02-22 NOTE — TELEPHONE ENCOUNTER
Called and spoke with patient regarding test results per Dr Gonzalez. Patient cannot get appointment with [facility] for 2 more weeks. Prescription for amoxicillin sent to pharmacy per Dr Gonzalez. Mami Rodriguez

## 2019-02-27 ENCOUNTER — TRANSFERRED RECORDS (OUTPATIENT)
Dept: FAMILY MEDICINE | Facility: CLINIC | Age: 64
End: 2019-02-27

## 2019-03-12 DIAGNOSIS — M79.605 LOW BACK PAIN RADIATING TO LEFT LEG: ICD-10-CM

## 2019-03-12 DIAGNOSIS — M54.50 LOW BACK PAIN RADIATING TO LEFT LEG: ICD-10-CM

## 2019-03-12 DIAGNOSIS — R10.9 FLANK PAIN: ICD-10-CM

## 2019-03-12 RX ORDER — TRAMADOL HYDROCHLORIDE 50 MG/1
TABLET ORAL
Qty: 40 TABLET | Refills: 0 | Status: SHIPPED | OUTPATIENT
Start: 2019-03-12 | End: 2019-05-16

## 2019-03-27 ENCOUNTER — TRANSFERRED RECORDS (OUTPATIENT)
Dept: FAMILY MEDICINE | Facility: CLINIC | Age: 64
End: 2019-03-27

## 2019-05-16 DIAGNOSIS — M79.605 LOW BACK PAIN RADIATING TO LEFT LEG: ICD-10-CM

## 2019-05-16 DIAGNOSIS — M54.50 LOW BACK PAIN RADIATING TO LEFT LEG: ICD-10-CM

## 2019-05-16 DIAGNOSIS — R10.9 FLANK PAIN: ICD-10-CM

## 2019-05-16 RX ORDER — TRAMADOL HYDROCHLORIDE 50 MG/1
TABLET ORAL
Qty: 40 TABLET | Refills: 0 | Status: SHIPPED | OUTPATIENT
Start: 2019-05-16 | End: 2019-07-18

## 2019-07-18 ENCOUNTER — OFFICE VISIT (OUTPATIENT)
Dept: FAMILY MEDICINE | Facility: CLINIC | Age: 64
End: 2019-07-18

## 2019-07-18 VITALS
DIASTOLIC BLOOD PRESSURE: 88 MMHG | SYSTOLIC BLOOD PRESSURE: 138 MMHG | HEART RATE: 86 BPM | WEIGHT: 278 LBS | OXYGEN SATURATION: 96 % | BODY MASS INDEX: 35.69 KG/M2 | RESPIRATION RATE: 16 BRPM

## 2019-07-18 DIAGNOSIS — Z86.2 HISTORY OF IRON DEFICIENCY ANEMIA: ICD-10-CM

## 2019-07-18 DIAGNOSIS — Z13.6 SCREENING FOR AAA (ABDOMINAL AORTIC ANEURYSM): ICD-10-CM

## 2019-07-18 DIAGNOSIS — F33.41 RECURRENT MAJOR DEPRESSIVE DISORDER, IN PARTIAL REMISSION (H): ICD-10-CM

## 2019-07-18 DIAGNOSIS — M54.50 LOW BACK PAIN RADIATING TO LEFT LEG: ICD-10-CM

## 2019-07-18 DIAGNOSIS — E66.812 CLASS 2 SEVERE OBESITY DUE TO EXCESS CALORIES WITH SERIOUS COMORBIDITY AND BODY MASS INDEX (BMI) OF 35.0 TO 35.9 IN ADULT (H): ICD-10-CM

## 2019-07-18 DIAGNOSIS — M79.605 LOW BACK PAIN RADIATING TO LEFT LEG: ICD-10-CM

## 2019-07-18 DIAGNOSIS — G89.29 CHRONIC BILATERAL LOW BACK PAIN, WITH SCIATICA PRESENCE UNSPECIFIED: ICD-10-CM

## 2019-07-18 DIAGNOSIS — E66.01 CLASS 2 SEVERE OBESITY DUE TO EXCESS CALORIES WITH SERIOUS COMORBIDITY AND BODY MASS INDEX (BMI) OF 35.0 TO 35.9 IN ADULT (H): ICD-10-CM

## 2019-07-18 DIAGNOSIS — M25.50 POLYARTHRALGIA: ICD-10-CM

## 2019-07-18 DIAGNOSIS — Z87.11 HISTORY OF GASTRIC ULCER: ICD-10-CM

## 2019-07-18 DIAGNOSIS — Z00.00 ENCOUNTER FOR MEDICARE ANNUAL WELLNESS EXAM: Primary | ICD-10-CM

## 2019-07-18 DIAGNOSIS — M54.5 CHRONIC BILATERAL LOW BACK PAIN, WITH SCIATICA PRESENCE UNSPECIFIED: ICD-10-CM

## 2019-07-18 DIAGNOSIS — Z13.6 SCREENING FOR CARDIOVASCULAR CONDITION: ICD-10-CM

## 2019-07-18 LAB
% GRANULOCYTES: 68.2 % (ref 42.2–75.2)
HCT VFR BLD AUTO: 44.5 % (ref 39–51)
HEMOGLOBIN: 14.9 G/DL (ref 13.4–17.5)
LYMPHOCYTES NFR BLD AUTO: 24.9 % (ref 20.5–51.1)
MCH RBC QN AUTO: 31.6 PG (ref 27–31)
MCHC RBC AUTO-ENTMCNC: 33.5 G/DL (ref 33–37)
MCV RBC AUTO: 94.1 FL (ref 80–100)
MONOCYTES NFR BLD AUTO: 6.9 % (ref 1.7–9.3)
PLATELET # BLD AUTO: 250 K/UL (ref 140–450)
RBC # BLD AUTO: 4.72 X10/CMM (ref 4.2–5.9)
WBC # BLD AUTO: 6.5 X10/CMM (ref 3.8–11)

## 2019-07-18 PROCEDURE — 36415 COLL VENOUS BLD VENIPUNCTURE: CPT | Performed by: FAMILY MEDICINE

## 2019-07-18 PROCEDURE — G0439 PPPS, SUBSEQ VISIT: HCPCS | Performed by: FAMILY MEDICINE

## 2019-07-18 PROCEDURE — 99214 OFFICE O/P EST MOD 30 MIN: CPT | Mod: 25 | Performed by: FAMILY MEDICINE

## 2019-07-18 PROCEDURE — 85025 COMPLETE CBC W/AUTO DIFF WBC: CPT | Performed by: FAMILY MEDICINE

## 2019-07-18 RX ORDER — TRAMADOL HYDROCHLORIDE 50 MG/1
50 TABLET ORAL EVERY 6 HOURS PRN
Qty: 40 TABLET | Refills: 1 | Status: SHIPPED | OUTPATIENT
Start: 2019-07-18 | End: 2019-10-02

## 2019-07-18 RX ORDER — METAXALONE 800 MG/1
800 TABLET ORAL 3 TIMES DAILY PRN
Qty: 60 TABLET | Refills: 3 | Status: SHIPPED | OUTPATIENT
Start: 2019-07-18 | End: 2019-12-12 | Stop reason: ALTCHOICE

## 2019-07-18 RX ORDER — OXYBUTYNIN CHLORIDE 5 MG/1
1 TABLET ORAL DAILY
COMMUNITY
Start: 2019-06-07

## 2019-07-18 ASSESSMENT — ANXIETY QUESTIONNAIRES
6. BECOMING EASILY ANNOYED OR IRRITABLE: SEVERAL DAYS
5. BEING SO RESTLESS THAT IT IS HARD TO SIT STILL: SEVERAL DAYS
1. FEELING NERVOUS, ANXIOUS, OR ON EDGE: SEVERAL DAYS
2. NOT BEING ABLE TO STOP OR CONTROL WORRYING: NOT AT ALL
GAD7 TOTAL SCORE: 3
3. WORRYING TOO MUCH ABOUT DIFFERENT THINGS: NOT AT ALL
7. FEELING AFRAID AS IF SOMETHING AWFUL MIGHT HAPPEN: NOT AT ALL
IF YOU CHECKED OFF ANY PROBLEMS ON THIS QUESTIONNAIRE, HOW DIFFICULT HAVE THESE PROBLEMS MADE IT FOR YOU TO DO YOUR WORK, TAKE CARE OF THINGS AT HOME, OR GET ALONG WITH OTHER PEOPLE: NOT DIFFICULT AT ALL

## 2019-07-18 ASSESSMENT — PATIENT HEALTH QUESTIONNAIRE - PHQ9
SUM OF ALL RESPONSES TO PHQ QUESTIONS 1-9: 7
5. POOR APPETITE OR OVEREATING: NOT AT ALL

## 2019-07-18 NOTE — PATIENT INSTRUCTIONS
Patient Education   Personalized Prevention Plan  You are due for the preventive services outlined below.  Your care team is available to assist you in scheduling these services.  If you have already completed any of these items, please share that information with your care team to update in your medical record.  Health Maintenance Due   Topic Date Due     Zoster (Shingles) Vaccine (1 of 2) 03/03/2003     AORTIC ANEURYSM SCREENING (SYSTEM ASSIGNED)  03/03/2018     Depression Assessment  01/17/2019     FALL RISK ASSESSMENT  07/17/2019     Annual Wellness Visit  07/17/2019        Patient Education   Personalized Prevention Plan  You are due for the preventive services outlined below.  Your care team is available to assist you in scheduling these services.  If you have already completed any of these items, please share that information with your care team to update in your medical record.  Health Maintenance Due   Topic Date Due     Zoster (Shingles) Vaccine (1 of 2) 03/03/2003     AORTIC ANEURYSM SCREENING (SYSTEM ASSIGNED)  03/03/2018       Depression and Suicide in Older Adults    Nearly 2 million older Americans have some type of depression. Sadly, some of them even take their own lives. Yet depression among older adults is often ignored. Learn the warning signs. You may help spare a loved one needless pain. You may also save a life.  What is depression?  Depression is a serious illness that affects the way you think and feel. It is not a normal part of aging, nor is it a sign of weakness, a character flaw, or something you can snap out of. Most people with depression need treatment to get better. The most common symptom is a feeling of deep sadness. People who are depressed also may seem tired and listless. And nothing seems to give them pleasure. It s normal to grieve or be sad sometimes. But sadness lessens or passes with time. Depression rarely goes away or improves on its own. Other symptoms of depression  are:    Sleeping more or less than normal    Eating more or less than normal    Having headaches, stomachaches, or other pains that don t go away    Feeling nervous,  empty,  or worthless    Crying a great deal    Thinking or talking about suicide or death    Feeling confused or forgetful  What causes it?  The causes of depression aren t fully known. But, it is thought to result from a complex interaction of biochemistry, genetics, environmental factors, and personality. Certain chemicals in the brain play a role. Depression does run in families. And life stresses can also trigger depression in some people. Older adults often face many stressors, such as death of friends or a spouse, health problems, and financial concerns.  How you can help  Often, depressed people may not want to ask for help. When they do, they may be ignored. Or, they may receive the wrong treatment. You can help by showing parents and older friends love and support. If they seem depressed, help them find the right treatment. Talk to your doctor. Or contact a local mental health center, social service agency, or hospital. With modern treatment, no one has to suffer from depression.  If your older friend or family member agrees, you can be an advocate for him or her in the healthcare setting. Many times, older adults have other chronic illnesses such as diabetes, heart disease, or cancer that can cause symptoms of depression. Medicine side effects can also contribute to certain behaviors and feelings. It is important that the older adult's healthcare provider listens and sorts out the causes of any symptoms of depression and makes referrals to mental health specialists when needed. Untreated depression can result in misdiagnosis, including brain disorders such as dementia and Alzheimer's. If the health professional does not take the issue of depression seriously, ask your family member or friend to consider finding another  provider.  Resources    National Suicide Prevention Lifeline (crisis hotline)924-552-QANQ (8255)    National [place] of Mental Mlpnbl357-834-8649pki.nimh.nih.gov    National [place] on Mental Rpllznl531-407-0389udu.pascale.org    Mental Health Iloh[place]w193-893-1976mbw.[facility].org    National Suicide Svyxrzf194-331-9813 (800-SUICIDE)   Date Last Reviewed: 2/1/2017 2000-2018 [employer]. [address] [place] [address]. All rights reserved. This information is not intended as a substitute for professional medical care. Always follow your healthcare professional's instructions.

## 2019-07-18 NOTE — LETTER
[facility] [address] [place]  [address]  Phone: [phone]    July 22, 2019      Wesley Art  [address] [place] [address]              Dear Wesley,    The results from your recent visit showed Blood counts are normal and iron levels are normal. You may stop your iron supplement. Recheck CBC in 6-12 months to verify stability.   Normal glucose, electrolytes, kidney function, and liver function.   Total and LDL cholesterol are mildly elevated, but you still have a good ratio. Continue to work on lower saturated fats in your diet.        Sincerely,     Ailyn Gutierrez M.D.    Results for orders placed or performed in visit on 07/18/19   Iron and TIBC ([facility])   Result Value Ref Range    Iron Binding Cap 341 250 - 450 ug/dL    UIBC 220 111 - 343 ug/dL    Iron 121 38 - 169 ug/dL    Iron Saturation 35 15 - 55 %    Narrative    Performed at:  [facility]  [employer] [place], [place]  [id number]  : Dereck Covarrubias MD, Phone:  [phone]   CBC with Diff/Plt (G)   Result Value Ref Range    WBC x10/cmm 6.5 3.8 - 11.0 x10/cmm    % Lymphocytes 24.9 20.5 - 51.1 %    % Monocytes 6.9 1.7 - 9.3 %    % Granulocytes 68.2 42.2 - 75.2 %    RBC x10/cmm 4.72 4.2 - 5.9 x10/cmm    Hemoglobin 14.9 13.4 - 17.5 g/dl    Hematocrit 44.5 39 - 51 %    MCV 94.1 80 - 100 fL    MCH 31.6 (A) 27.0 - 31.0 pg    MCHC 33.5 33.0 - 37.0 g/dL    Platelet Count 250 140 - 450 K/uL   Lipid Panel ([facility])   Result Value Ref Range    Cholesterol 223 (H) 100 - 199 mg/dL    Triglycerides 130 0 - 149 mg/dL    HDL Cholesterol 58 >39 mg/dL    VLDL Cholesterol Asael 26 5 - 40 mg/dL    LDL Cholesterol Calculated 139 (H) 0 - 99 mg/dL    LDL/HDL Ratio 2.4 0.0 - 3.6 ratio    Narrative    Performed at:  [address]  [facility]  [employer] [place], [place]  [id number]  : Dereck Covarrubias MD, Phone:  [phone]   Comp. Metabolic Panel (14) ([facility])   Result Value Ref Range    Glucose 99  65 - 99 mg/dL    Urea Nitrogen 13 8 - 27 mg/dL    Creatinine 1.04 0.76 - 1.27 mg/dL    eGFR If NonAfricn Am 74 >59 mL/min/1.73    eGFR If Africn Am 86 >59 mL/min/1.73    BUN/Creatinine Ratio 13 10 - 24    Sodium 140 134 - 144 mmol/L    Potassium 4.4 3.5 - 5.2 mmol/L    Chloride 103 96 - 106 mmol/L    Total CO2 26 20 - 29 mmol/L    Calcium 9.4 8.6 - 10.2 mg/dL    Protein Total 7.4 6.0 - 8.5 g/dL    Albumin 4.5 3.6 - 4.8 g/dL    Globulin, Total 2.9 1.5 - 4.5 g/dL    A/G Ratio 1.6 1.2 - 2.2    Bilirubin Total 0.3 0.0 - 1.2 mg/dL    Alkaline Phosphatase 87 39 - 117 IU/L    AST 21 0 - 40 IU/L    ALT 16 0 - 44 IU/L    Narrative    Performed at:  01 - [facility] [address], [place], [place]  [id number]  : Dereck Covarrubias MD, Phone:  [phone]

## 2019-07-18 NOTE — PROGRESS NOTES
"SUBJECTIVE:   Wesley Art is a 64 year old male who presents for Preventive Visit.  1. Depression: still having \"moods\" but knows how to handle them. No desire for change in medications. No suicidal thoughts. He remains active in AA and reports no alcohol since 11/11/07. He has good sleep and wakes feeling rested.   2. Hx iron deficiency anemia: still taking iron but would like to get off this as it causes some constipation.  3. Hx PUD: doing well without pain.  4. Musculoskeletal pain: as noted above, avoids NSAIDs but has pain chronically in hand, wrist, elbow, shoulder, low back. He has used Tramadol and Skelaxin with good effect and would like to keep a supply of this. He typically takes no more than one daily when needed.   5. Obesity: post gastric bypass, still struggles with weight, attends OA. This is a constant struggle.    Are you in the first 12 months of your Medicare coverage?  No    HPI  Do you feel safe in your environment? Yes    Do you have a Health Care Directive? No: Advance care planning reviewed with patient; information given to patient to review.      Right Ear: 1000 Hz: RESPONSE- on Level:  Pass   2000 Hz: RESPONSE- on Level:   Pass   4000 Hz: RESPONSE- on Level:   Pass    Left Ear:      4000 Hz: RESPONSE- on Level: Not heard   2000 Hz: RESPONSE- on Level:   Pass   1000 Hz: RESPONSE- on Level:   Pass    500 Hz: RESPONSE- on Level: Pass    Right Ear:    500 Hz: RESPONSE- on Level: Pass    Hearing Acuity: Pass    Hearing Assessment: normal  Fall risk NoneFallen 2 or more times in the past year?: No  Any fall with injury in the past year?: No    Cognitive Screening   1) Repeat 3 items (Leader, Season, Table)    2) Clock draw: NORMAL  3) 3 item recall: Recalls 3 objects  Results: 3 items recalled: COGNITIVE IMPAIRMENT LESS LIKELY    Mini-CogTM Copyright S Susan. Licensed by the author for use in [facility]; reprinted with permission (kayla@.[email or web address]). All rights reserved.  "     Do you have sleep apnea, excessive snoring or daytime drowsiness?: no    Reviewed and updated as needed this visit by clinical staff  Tobacco  Allergies  Meds         Reviewed and updated as needed this visit by Provider        Social History     Tobacco Use     Smoking status: Never Smoker     Smokeless tobacco: Never Used   Substance Use Topics     Alcohol use: No     Alcohol/week: 0.0 oz     Comment: sober since 11/11/2007         Alcohol Use 7/12/2017   Prescreen: >3 drinks/day or >7 drinks/week? none               Current providers sharing in care for this patient include:   Patient Care Team:  Ailyn Gutierrez MD as PCP - General (Family Practice)  Ailyn Gutierrez MD as Assigned PCP    The following health maintenance items are reviewed in Epic and correct as of today:  Health Maintenance   Topic Date Due     ZOSTER IMMUNIZATION (1 of 2) 03/03/2003     AORTIC ANEURYSM SCREENING (SYSTEM ASSIGNED)  03/03/2018     INFLUENZA VACCINE (1) 09/01/2019     PNEUMOCOCCAL IMMUNIZATION 65+ LOW/MEDIUM RISK (2 of 2 - PPSV23) 11/29/2019     PHQ-9  01/18/2020     MEDICARE ANNUAL WELLNESS VISIT  07/18/2020     FALL RISK ASSESSMENT  07/18/2020     ADVANCE CARE PLANNING  11/21/2023     COLONOSCOPY  01/14/2024     DTAP/TDAP/TD IMMUNIZATION (3 - Td) 03/04/2024     LIPID  07/18/2024     HEPATITIS C SCREENING  Completed     DEPRESSION ACTION PLAN  Completed     IPV IMMUNIZATION  Aged Out     MENINGITIS IMMUNIZATION  Aged Out     Lab work is in process  Labs reviewed in EPIC  BP Readings from Last 3 Encounters:   07/18/19 138/88   02/18/19 132/86   01/21/19 154/90    Wt Readings from Last 3 Encounters:   07/18/19 126.1 kg (278 lb)   02/18/19 126.1 kg (278 lb)   01/21/19 126.1 kg (278 lb)                  Patient Active Problem List   Diagnosis     Major depression in partial remission (H)     Primary osteoarthritis involving multiple joints     Benign nodular prostatic hyperplasia with lower urinary tract symptoms      Glucose intolerance (impaired glucose tolerance)     LU (generalized anxiety disorder)     Morbid obesity (H)     Melena     GIB (gastrointestinal bleeding)     Anastomotic ulcer     Past Surgical History:   Procedure Laterality Date     APPENDECTOMY  child     CHOLECYSTECTOMY, LAPOROSCOPIC  1998    Cholecystectomy, Laparoscopic     LAPAROSCOPIC REVISION GASTROPLASTY TO MACIEJ-EN-Y  2006     VASECTOMY  1980       Social History     Tobacco Use     Smoking status: Never Smoker     Smokeless tobacco: Never Used   Substance Use Topics     Alcohol use: No     Alcohol/week: 0.0 oz     Comment: sober since 11/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 Father          Current Outpatient Medications   Medication Sig Dispense Refill     Ascorbic Acid (VITAMIN C PO) Take 1,000 mg by mouth daily        B-12, Methylcobalamin, 1000 MCG SUBL Place 1 tablet under the tongue every other day 90 tablet 1     Boron 3 MG CAPS Take 3 mg by mouth 2 times daily       buPROPion (WELLBUTRIN SR) 100 MG 12 hr tablet TAKE 1 TABLET TWICE A  tablet 3     Cholecalciferol (VITAMIN D) 2000 UNITS tablet Take 5,000 Units by mouth daily  100 tablet 3     cyclobenzaprine (FLEXERIL) 5 MG tablet Take 0.5-1 tablets (2.5-5 mg) by mouth 3 times daily as needed for muscle spasms 20 tablet 0     doxazosin (CARDURA) 4 MG tablet Take 4 mg by mouth 2 times daily       ferrous sulfate (FEROSUL) 325 (65 Fe) MG tablet Take 325 mg by mouth daily (with breakfast)       finasteride (PROSCAR) 5 MG tablet Take 1 tablet (5 mg) by mouth daily 90 tablet 3     HEMP OIL OR EXTRACT OR OTHER CBD CANNABINOID, NOT MEDICAL CANNABIS,        metaxalone (SKELAXIN) 800 MG tablet Take 1 tablet (800 mg) by mouth 3 times daily as needed for moderate pain 60 tablet 3     Multiple Vitamins-Minerals (MULTI COMPLETE PO) Take 1 tablet by mouth daily        omeprazole (PRILOSEC) 40 MG DR capsule TAKE 1 CAPSULE TWICE A DAY 30 TO 60 MINUTES BEFORE MEALS 60  "capsule 0     sertraline (ZOLOFT) 100 MG tablet TAKE 1 TABLET DAILY 30 tablet 1     traMADol (ULTRAM) 50 MG tablet Take 1 tablet (50 mg) by mouth every 6 hours as needed for severe pain 40 tablet 1     oxybutynin (DITROPAN) 5 MG tablet Take 1 tablet by mouth daily       Allergies   Allergen Reactions     Magnesium Citrate Other (See Comments)     syncope     Recent Labs   Lab Test 07/18/19  0847 02/18/19  1430  11/22/18  0735 11/21/18  0509 11/20/18  0838  07/17/18  1007 07/17/18  1006 07/12/17  1010  07/07/16  0851   A1C  --   --   --   --   --   --   --  5.5  --  5.6  --  5.6   *  --   --   --   --   --   --   --  103* 116*  --   --    HDL 58  --   --   --   --   --   --   --  56 65  --   --    TRIG 130  --   --   --   --   --   --   --  117 126  --   --    ALT 16  --   --   --   --  28  --   --  22 23  --   --    CR 1.04 1.03   < > 0.86 0.83 1.00  --   --  1.15 0.92   < >  --    GFRESTIMATED  --   --   --  89 >90 75   < >  --   --   --   --   --    GFRESTBLACK  --   --   --  >90 >90 >90   < >  --   --   --   --   --    POTASSIUM 4.4 4.7   < > 3.6 3.4 4.3  --   --  4.3 4.6  --   --     < > = values in this interval not displayed.          Review of Systems  Constitutional, HEENT, cardiovascular, pulmonary, GI, , musculoskeletal, neuro, skin, endocrine and psych systems are negative, except as otherwise noted.    OBJECTIVE:   /88   Pulse 86   Resp 16   Wt 126.1 kg (278 lb)   SpO2 96%   BMI 35.69 kg/m   Estimated body mass index is 35.69 kg/m  as calculated from the following:    Height as of 11/20/18: 1.88 m (6' 2\").    Weight as of this encounter: 126.1 kg (278 lb).  Physical Exam  GENERAL: healthy, alert and no distress, obese  EYES: Eyes grossly normal to inspection, PERRL and conjunctivae and sclerae normal  HENT: ear canals and TM's normal, nose and mouth without ulcers or lesions  NECK: no adenopathy, no asymmetry, masses, or scars and thyroid normal to palpation  RESP: lungs clear to " auscultation - no rales, rhonchi or wheezes  CV: regular rate and rhythm, normal S1 S2, no S3 or S4, no murmur, click or rub, no peripheral edema and peripheral pulses strong  ABDOMEN: soft, nontender, no hepatosplenomegaly, no masses and bowel sounds normal   (male): normal male genitalia without lesions or urethral discharge, no hernia  RECTAL: normal sphincter tone, no rectal masses, prostate normal size, smooth, nontender without nodules or masses  MS: no gross musculoskeletal defects noted, no edema  SKIN: no suspicious lesions or rashes  NEURO: Normal strength and tone, mentation intact and speech normal  PSYCH: mentation appears normal, affect normal/bright        ASSESSMENT / PLAN:   Wesley was seen today for physical.    Diagnoses and all orders for this visit:    Encounter for Medicare annual wellness exam  Healthy appearing 64 year old male. Imms are UTD, he will need Prevnar booster next year and recommend Shingrix. Due for PSA. Recommend ongoing regular exercise and healthy eating habits.    Recurrent major depressive disorder, in partial remission (H)  He is offered adjustment of medications and declines at this time. He feels he is a good  of his mental state and knows how to handle the difficulties that arise. Continue bupropion and sertraline.    Low back pain radiating to left leg    Polyarthralgia  -     traMADol (ULTRAM) 50 MG tablet; Take 1 tablet (50 mg) by mouth every 6 hours as needed for severe pain  Use judiciously only as needed.    Chronic bilateral low back pain, with sciatica presence unspecified  -     metaxalone (SKELAXIN) 800 MG tablet; Take 1 tablet (800 mg) by mouth 3 times daily as needed for moderate pain    Screening for cardiovascular condition  -     Lipid Panel ([facility])  -     Comp. Metabolic Panel (14) ([facility])    History of iron deficiency anemia / History of gastric ulcer  -     Iron and TIBC ([facility])  -     CBC with Diff/Plt ([facility])  If normal iron levels he will  "be advised to stop iron supplement.    Screening for AAA (abdominal aortic aneurysm)  -     Abdominal Aortic Aneurysm Screening/Tracking  Never a smoker therefore does not meet criteria for screening.      End of Life Planning:  Patient currently has an advanced directive: Yes.  Practitioner is supportive of decision.    COUNSELING:  Reviewed preventive health counseling, as reflected in patient instructions       Regular exercise       Healthy diet/nutrition       Vision screening       Hearing screening       Dental care       Bladder control       Colon cancer screening       Prostate cancer screening    Estimated body mass index is 35.69 kg/m  as calculated from the following:    Height as of 11/20/18: 1.88 m (6' 2\").    Weight as of this encounter: 126.1 kg (278 lb).    Weight management plan: Discussed healthy diet and exercise guidelines     reports that he has never smoked. He has never used smokeless tobacco.      Appropriate preventive services were discussed with this patient, including applicable screening as appropriate for cardiovascular disease, diabetes, osteopenia/osteoporosis, and glaucoma.  As appropriate for age/gender, discussed screening for colorectal cancer, prostate cancer, breast cancer, and cervical cancer. Checklist reviewing preventive services available has been given to the patient.    Reviewed patients plan of care and provided an AVS. The Basic Care Plan (routine screening as documented in Health Maintenance) for Wesley meets the Care Plan requirement. This Care Plan has been established and reviewed with the Patient.    Counseling Resources:  ATP IV Guidelines  Pooled Cohorts Equation Calculator  Breast Cancer Risk Calculator  FRAX Risk Assessment  ICSI Preventive Guidelines  Dietary Guidelines for Americans, 2010  USDA's MyPlate  ASA Prophylaxis  Lung CA Screening    Ailyn Gutierrez MD  [facility]    Identified Health Risks:    The patient's PHQ-9 score is consistent " with mild depression. See above plan.

## 2019-07-19 LAB
ALBUMIN SERPL-MCNC: 4.5 G/DL (ref 3.6–4.8)
ALBUMIN/GLOB SERPL: 1.6 {RATIO} (ref 1.2–2.2)
ALP SERPL-CCNC: 87 IU/L (ref 39–117)
ALT SERPL-CCNC: 16 IU/L (ref 0–44)
AST SERPL-CCNC: 21 IU/L (ref 0–40)
BILIRUB SERPL-MCNC: 0.3 MG/DL (ref 0–1.2)
BUN SERPL-MCNC: 13 MG/DL (ref 8–27)
BUN/CREATININE RATIO: 13 (ref 10–24)
CALCIUM SERPL-MCNC: 9.4 MG/DL (ref 8.6–10.2)
CHLORIDE SERPLBLD-SCNC: 103 MMOL/L (ref 96–106)
CHOLEST SERPL-MCNC: 223 MG/DL (ref 100–199)
CREAT SERPL-MCNC: 1.04 MG/DL (ref 0.76–1.27)
EGFR IF AFRICN AM: 86 ML/MIN/1.73
EGFR IF NONAFRICN AM: 74 ML/MIN/1.73
GLOBULIN, TOTAL: 2.9 G/DL (ref 1.5–4.5)
GLUCOSE SERPL-MCNC: 99 MG/DL (ref 65–99)
HDLC SERPL-MCNC: 58 MG/DL
IRON BINDING CAP: 341 UG/DL (ref 250–450)
IRON SATURATION: 35 % (ref 15–55)
IRON: 121 UG/DL (ref 38–169)
LDL/HDL RATIO: 2.4 RATIO (ref 0–3.6)
LDLC SERPL CALC-MCNC: 139 MG/DL (ref 0–99)
POTASSIUM SERPL-SCNC: 4.4 MMOL/L (ref 3.5–5.2)
PROT SERPL-MCNC: 7.4 G/DL (ref 6–8.5)
SODIUM SERPL-SCNC: 140 MMOL/L (ref 134–144)
TOTAL CO2: 26 MMOL/L (ref 20–29)
TRIGL SERPL-MCNC: 130 MG/DL (ref 0–149)
UIBC: 220 UG/DL (ref 111–343)
VLDLC SERPL CALC-MCNC: 26 MG/DL (ref 5–40)

## 2019-07-19 ASSESSMENT — ANXIETY QUESTIONNAIRES: GAD7 TOTAL SCORE: 3

## 2019-07-23 DIAGNOSIS — F33.41 RECURRENT MAJOR DEPRESSIVE DISORDER, IN PARTIAL REMISSION (H): ICD-10-CM

## 2019-07-23 RX ORDER — BUPROPION HYDROCHLORIDE 100 MG/1
TABLET, EXTENDED RELEASE ORAL
Qty: 180 TABLET | Refills: 3 | Status: SHIPPED | OUTPATIENT
Start: 2019-07-23

## 2019-07-23 NOTE — TELEPHONE ENCOUNTER
Refill medication Bupropion    LOV 07/18/2019  Labs 07/18/2019    Aleshia Escobar MA on 7/23/2019 at 10:47 AM

## 2019-10-02 DIAGNOSIS — M25.50 POLYARTHRALGIA: ICD-10-CM

## 2019-10-04 RX ORDER — TRAMADOL HYDROCHLORIDE 50 MG/1
TABLET ORAL
Qty: 40 TABLET | Refills: 0 | Status: SHIPPED | OUTPATIENT
Start: 2019-10-04

## 2019-10-25 DIAGNOSIS — F33.41 RECURRENT MAJOR DEPRESSIVE DISORDER, IN PARTIAL REMISSION (H): ICD-10-CM

## 2019-10-25 DIAGNOSIS — F41.1 GAD (GENERALIZED ANXIETY DISORDER): ICD-10-CM

## 2019-10-25 RX ORDER — SERTRALINE HYDROCHLORIDE 100 MG/1
100 TABLET, FILM COATED ORAL DAILY
Qty: 90 TABLET | Refills: 1 | Status: SHIPPED | OUTPATIENT
Start: 2019-10-25 | End: 2020-04-16

## 2019-11-20 ENCOUNTER — OFFICE VISIT (OUTPATIENT)
Dept: FAMILY MEDICINE | Facility: CLINIC | Age: 64
End: 2019-11-20

## 2019-11-20 VITALS
DIASTOLIC BLOOD PRESSURE: 78 MMHG | SYSTOLIC BLOOD PRESSURE: 138 MMHG | OXYGEN SATURATION: 95 % | WEIGHT: 286 LBS | HEART RATE: 73 BPM | BODY MASS INDEX: 36.72 KG/M2

## 2019-11-20 DIAGNOSIS — L72.3 INFECTED SEBACEOUS CYST: Primary | ICD-10-CM

## 2019-11-20 DIAGNOSIS — L08.9 INFECTED SEBACEOUS CYST: Primary | ICD-10-CM

## 2019-11-20 PROCEDURE — 99213 OFFICE O/P EST LOW 20 MIN: CPT | Performed by: FAMILY MEDICINE

## 2019-11-20 RX ORDER — SULFAMETHOXAZOLE/TRIMETHOPRIM 800-160 MG
1 TABLET ORAL 2 TIMES DAILY
Qty: 14 TABLET | Refills: 0 | Status: SHIPPED | OUTPATIENT
Start: 2019-11-20 | End: 2019-12-06

## 2019-11-20 NOTE — PROGRESS NOTES
SUBJECTIVE:   Chief Complaint   Patient presents with     Derm Problem     Wesley Art is a 64 year old male who presents for evaluation of and area of redness, tenderness, swelling and warmth of the skin that developed on the  Upper back.  Patient has had purulent (pus) drainage , skin erythema (reddened skin) and tenderness for 1 week.    Precipitating event was prior cyst removal years ago.    Therapies tried: none.    Past Medical History:   Diagnosis Date     Alcohol abuse, unspecified     PMH alcohol abuse     Depressive disorder, not elsewhere classified      Duodenal ulcer      Essential hypertension, benign     resolved     Hematuria      Obesity, unspecified      Osteoarthritis 6/12/2015     Type II or unspecified type diabetes mellitus without mention of complication, not stated as uncontrolled     resolved       ALLERGIES:  Magnesium citrate    Ascorbic Acid (VITAMIN C PO), Take 1,000 mg by mouth daily   B-12, Methylcobalamin, 1000 MCG SUBL, Place 1 tablet under the tongue every other day  [place] 3 MG CAPS, Take 3 mg by mouth 2 times daily  buPROPion (WELLBUTRIN SR) 100 MG 12 hr tablet, TAKE 1 TABLET TWICE A DAY  Cholecalciferol (VITAMIN D) 2000 UNITS tablet, Take 5,000 Units by mouth daily   cyclobenzaprine (FLEXERIL) 5 MG tablet, Take 0.5-1 tablets (2.5-5 mg) by mouth 3 times daily as needed for muscle spasms  doxazosin (CARDURA) 4 MG tablet, Take 4 mg by mouth 2 times daily  finasteride (PROSCAR) 5 MG tablet, Take 1 tablet (5 mg) by mouth daily  HEMP OIL OR EXTRACT OR OTHER CBD CANNABINOID, NOT MEDICAL CANNABIS,,   metaxalone (SKELAXIN) 800 MG tablet, Take 1 tablet (800 mg) by mouth 3 times daily as needed for moderate pain  Multiple Vitamins-Minerals (MULTI COMPLETE PO), Take 1 tablet by mouth daily   omeprazole (PRILOSEC) 40 MG DR capsule, TAKE 1 CAPSULE TWICE A DAY 30 TO 60 MINUTES BEFORE MEALS  oxybutynin (DITROPAN) 5 MG tablet, Take 1 tablet by mouth daily  sertraline (ZOLOFT) 100 MG  tablet, Take 1 tablet (100 mg) by mouth daily  traMADol (ULTRAM) 50 MG tablet, TAKE 1 TABLET BY MOUTH EVERY 6 HOURS AS NEEDED FOR SEVERE PAIN    No current facility-administered medications on file prior to visit.       Social History     Tobacco Use     Smoking status: Never Smoker     Smokeless tobacco: Never Used   Substance Use Topics     Alcohol use: No     Alcohol/week: 0.0 standard drinks     Comment: sober since 11/11/2007       Family History   Problem Relation Age of Onset     C.A.D. Father      ROS:  CONSTITUTIONAL:NEGATIVE for fever, chills, change in weight  GI: NEGATIVE for nausea, abdominal pain, heartburn, or change in bowel habits    OBJECTIVE:  /78 (BP Location: Left arm, Patient Position: Sitting, Cuff Size: Adult Large)   Pulse 73   Wt 129.7 kg (286 lb)   SpO2 95%   BMI 36.72 kg/m      Skin area involved is 3 cm by 3 cm on the L upper back.   The skin appear erythematous, moderately swollen, with tenderness and warmth to the touch.   GENERAL APPEARANCE: no distress and over weight  EYES: EOMI,  PERRL, conjunctiva clear  NECK: supple, non-tender to palpation, no adenopathy noted  RESP: lungs clear to auscultation - no rales, rhonchi or wheezes  CV: regular rates and rhythm, normal S1 S2, no murmur noted    ASSESSMENT:  1. Infected sebaceous cyst  Referred back to surgeon for visit/removal  - Aerobic Bacterial Culture ([facility])  - sulfamethoxazole-trimethoprim (BACTRIM DS/SEPTRA DS) 800-160 MG tablet; Take 1 tablet by mouth 2 times daily  Dispense: 14 tablet; Refill: 0

## 2019-11-23 LAB
AEROBIC BACTERIAL CULTURE: ABNORMAL
ANTIMICROBIAL SUSCEPTIBILITY: ABNORMAL
Lab: ABNORMAL

## 2019-12-03 ENCOUNTER — OFFICE VISIT (OUTPATIENT)
Dept: SURGERY | Facility: CLINIC | Age: 64
End: 2019-12-03
Payer: COMMERCIAL

## 2019-12-03 VITALS
BODY MASS INDEX: 36.72 KG/M2 | DIASTOLIC BLOOD PRESSURE: 80 MMHG | HEART RATE: 106 BPM | WEIGHT: 286 LBS | SYSTOLIC BLOOD PRESSURE: 130 MMHG

## 2019-12-03 DIAGNOSIS — L72.3 SEBACEOUS CYST: Primary | ICD-10-CM

## 2019-12-03 PROCEDURE — 99203 OFFICE O/P NEW LOW 30 MIN: CPT | Performed by: SURGERY

## 2019-12-03 NOTE — LETTER
[facility]    [address], [address]  [place], [place] [address]  Phone [phone]  Fax [phone]    [address], [address]  [facility] [place] [address]  Phone [phone]  Fax [phone]    [email or web address]     December 3, 2019    Re: Wesley Art  : 1955      [facility]  Surgery Consultation     HPI:Patient is a 64 year old male who is here for consultation requested by Ailyn Gutierrez [phone] for evaluation of recurrent back cyst.  He had an infected sebaceous cyst a number of years ago which required an incision and drainage.  He packed the wound and it healed.  The wound has now started to become more painful and raised.  He denies any signs of infection at this time but he has been on antibiotics provided by his primary care provider.  He has no new lesions or other complaints today. Patient denies fevers, chills, nausea, vomiting, SOB, chest pain, abdominal pain.     PMH:   has a past medical history of Alcohol abuse, unspecified, Depressive disorder, not elsewhere classified, Duodenal ulcer, Essential hypertension, benign, Hematuria, Obesity, unspecified, Osteoarthritis (2015), and Type II or unspecified type diabetes mellitus without mention of complication, not stated as uncontrolled.  PSH:    has a past surgical history that includes appendectomy (child); Vasectomy (); Laparoscopic revision gastroplasty to bradley-en-y (); and cholecystectomy, laporoscopic ().  Social History:    reports that he has never smoked. He has never used smokeless tobacco. He reports that he does not drink alcohol or use drugs.  Family History:   family history includes C.A.D. in his father.  Medications/Allergies: Home medications and allergies reviewed.     ROS:  The 10 point Review of Systems is negative other than noted in the HPI.     Physical Exam:  /80   Pulse 106    Wt 129.7 kg (286 lb)   BMI 36.72 kg/m    General: Generally appears well.  Eyes- Sclera Clear- No icterus  Respiratory- Chest rise equal Bilateral  Integumentary-back- in between shoulder blades there is a 2 cm raised lesion.  Induration present.  No erythema.  No drainage.     Impression and Plan:  Patient is a 64 year old male with recurrent sebaceous cyst     PLAN:   I have described the pathophysiology of recurrent sebaceous cyst including further work-up and management.  He does not have signs of infection and it would be a good time to do a formal excision and closure.  He will schedule under local in the near future. The risks associated with the procedure including, but not limited to, recurrence, pain, hematoma, infection, and anesthetic reaction were discussed with the patient. The patient indicated understanding of the discussion, asked appropriate questions, and provided consent.      Thank you very much for this consult.       Ulises Myers M.D.  [facility]  [phone]

## 2019-12-04 ENCOUNTER — TELEPHONE (OUTPATIENT)
Dept: SURGERY | Facility: CLINIC | Age: 64
End: 2019-12-04

## 2019-12-04 NOTE — TELEPHONE ENCOUNTER
Type of surgery: excision back lesion  Location of surgery: [facility]  Date and time of surgery: 12/18/19 9:30am  Surgeon: Dr Myers  Pre-Op Appt Date: not required  Post-Op Appt Date: pt to schedule   Packet sent out: Yes  Pre-cert/Authorization completed:  Not Applicable  Date: 12/3/19    Local anesthesia  Endo room

## 2019-12-04 NOTE — PROGRESS NOTES
[facility]  Surgery Consultation    HPI:Patient is a 64 year old male who is here for consultation requested by Ailyn Gutierrez [phone] for evaluation of recurrent back cyst.  He had an infected sebaceous cyst a number of years ago which required an incision and drainage.  He packed the wound and it healed.  The wound has now started to become more painful and raised.  He denies any signs of infection at this time but he has been on antibiotics provided by his primary care provider.  He has no new lesions or other complaints today. Patient denies fevers, chills, nausea, vomiting, SOB, chest pain, abdominal pain.    PMH:   has a past medical history of Alcohol abuse, unspecified, Depressive disorder, not elsewhere classified, Duodenal ulcer, Essential hypertension, benign, Hematuria, Obesity, unspecified, Osteoarthritis (6/12/2015), and Type II or unspecified type diabetes mellitus without mention of complication, not stated as uncontrolled.  PSH:    has a past surgical history that includes appendectomy (child); Vasectomy (1980); Laparoscopic revision gastroplasty to bradley-en-y (2006); and cholecystectomy, laporoscopic (1998).  Social History:    reports that he has never smoked. He has never used smokeless tobacco. He reports that he does not drink alcohol or use drugs.  Family History:   family history includes C.A.D. in his father.  Medications/Allergies: Home medications and allergies reviewed.    ROS:  The 10 point Review of Systems is negative other than noted in the HPI.    Physical Exam:  /80   Pulse 106   Wt 129.7 kg (286 lb)   BMI 36.72 kg/m    General: Generally appears well.  Eyes- Sclera Clear- No icterus  Respiratory- Chest rise equal Bilateral  Integumentary-back- in between shoulder blades there is a 2 cm raised lesion.  Induration present.  No erythema.  No drainage.    Impression and Plan:  Patient is a 64 year old male with recurrent sebaceous cyst    PLAN:    I have described the pathophysiology of recurrent sebaceous cyst including further work-up and management.  He does not have signs of infection and it would be a good time to do a formal excision and closure.  He will schedule under local in the near future. The risks associated with the procedure including, but not limited to, recurrence, pain, hematoma, infection, and anesthetic reaction were discussed with the patient. The patient indicated understanding of the discussion, asked appropriate questions, and provided consent.         Thank you very much for this consult.    Ulises Myers M.D.  [facility]  [phone]    Please route or send letter to:  Primary Care Provider (PCP) and Referring Provider

## 2019-12-06 ENCOUNTER — HOSPITAL ENCOUNTER (OUTPATIENT)
Dept: LAB | Facility: CLINIC | Age: 64
Discharge: HOME | End: 2019-12-06
Attending: PHYSICIAN ASSISTANT | Admitting: PHYSICIAN ASSISTANT
Payer: COMMERCIAL

## 2019-12-06 ENCOUNTER — OFFICE VISIT (OUTPATIENT)
Dept: SURGERY | Facility: CLINIC | Age: 64
End: 2019-12-06
Payer: COMMERCIAL

## 2019-12-06 VITALS
SYSTOLIC BLOOD PRESSURE: 132 MMHG | DIASTOLIC BLOOD PRESSURE: 84 MMHG | HEART RATE: 81 BPM | BODY MASS INDEX: 37.84 KG/M2 | WEIGHT: 285.5 LBS | OXYGEN SATURATION: 96 % | HEIGHT: 73 IN

## 2019-12-06 DIAGNOSIS — Z98.84 BARIATRIC SURGERY STATUS: ICD-10-CM

## 2019-12-06 DIAGNOSIS — M54.2 NECK PAIN: ICD-10-CM

## 2019-12-06 DIAGNOSIS — R53.81 PHYSICAL DECONDITIONING: Primary | ICD-10-CM

## 2019-12-06 DIAGNOSIS — M25.551 PAIN OF BOTH HIP JOINTS: ICD-10-CM

## 2019-12-06 DIAGNOSIS — E66.09 CLASS 2 OBESITY DUE TO EXCESS CALORIES WITHOUT SERIOUS COMORBIDITY WITH BODY MASS INDEX (BMI) OF 37.0 TO 37.9 IN ADULT: ICD-10-CM

## 2019-12-06 DIAGNOSIS — K91.2 POSTSURGICAL MALABSORPTION: ICD-10-CM

## 2019-12-06 DIAGNOSIS — M25.552 PAIN OF BOTH HIP JOINTS: ICD-10-CM

## 2019-12-06 DIAGNOSIS — E66.812 CLASS 2 OBESITY DUE TO EXCESS CALORIES WITHOUT SERIOUS COMORBIDITY WITH BODY MASS INDEX (BMI) OF 37.0 TO 37.9 IN ADULT: ICD-10-CM

## 2019-12-06 LAB
ERYTHROCYTE [DISTWIDTH] IN BLOOD BY AUTOMATED COUNT: 12.2 % (ref 10–15)
FERRITIN SERPL-MCNC: 117 NG/ML (ref 26–388)
HCT VFR BLD AUTO: 43.6 % (ref 40–53)
HGB BLD-MCNC: 14.8 G/DL (ref 13.3–17.7)
IRON SATN MFR SERPL: 23 % (ref 15–46)
IRON SERPL-MCNC: 75 UG/DL (ref 35–180)
MCH RBC QN AUTO: 32.5 PG (ref 26.5–33)
MCHC RBC AUTO-ENTMCNC: 33.9 G/DL (ref 31.5–36.5)
MCV RBC AUTO: 96 FL (ref 78–100)
PLATELET # BLD AUTO: 305 10E9/L (ref 150–450)
PTH-INTACT SERPL-MCNC: 81 PG/ML (ref 12–64)
RBC # BLD AUTO: 4.55 10E12/L (ref 4.4–5.9)
TIBC SERPL-MCNC: 332 UG/DL (ref 240–430)
VIT B12 SERPL-MCNC: 784 PG/ML (ref 193–986)
WBC # BLD AUTO: 7.6 10E9/L (ref 4–11)

## 2019-12-06 PROCEDURE — 82728 ASSAY OF FERRITIN: CPT | Performed by: PHYSICIAN ASSISTANT

## 2019-12-06 PROCEDURE — 99203 OFFICE O/P NEW LOW 30 MIN: CPT | Performed by: PHYSICIAN ASSISTANT

## 2019-12-06 PROCEDURE — 83970 ASSAY OF PARATHORMONE: CPT | Performed by: PHYSICIAN ASSISTANT

## 2019-12-06 PROCEDURE — 83540 ASSAY OF IRON: CPT | Performed by: PHYSICIAN ASSISTANT

## 2019-12-06 PROCEDURE — 97803 MED NUTRITION INDIV SUBSEQ: CPT | Performed by: DIETITIAN, REGISTERED

## 2019-12-06 PROCEDURE — 82306 VITAMIN D 25 HYDROXY: CPT | Performed by: PHYSICIAN ASSISTANT

## 2019-12-06 PROCEDURE — 85027 COMPLETE CBC AUTOMATED: CPT | Performed by: PHYSICIAN ASSISTANT

## 2019-12-06 PROCEDURE — 83550 IRON BINDING TEST: CPT | Performed by: PHYSICIAN ASSISTANT

## 2019-12-06 PROCEDURE — 36415 COLL VENOUS BLD VENIPUNCTURE: CPT | Performed by: PHYSICIAN ASSISTANT

## 2019-12-06 PROCEDURE — 82607 VITAMIN B-12: CPT | Performed by: PHYSICIAN ASSISTANT

## 2019-12-06 RX ORDER — CYANOCOBALAMIN (VITAMIN B-12) 1000 MCG
500 TABLET, SUBLINGUAL SUBLINGUAL DAILY
COMMUNITY

## 2019-12-06 ASSESSMENT — MIFFLIN-ST. JEOR: SCORE: 2128.9

## 2019-12-06 NOTE — PROGRESS NOTES
"NUTRITION POST OP APPOINTMENT  DATE OF VISIT: December 6, 2019    Wesley Art  1955  male  [id number]  64 year old     ASSESSMENT:    REASON FOR VISIT:  Wesley is a 64 year old year old male presents today for 14 year PO nutrition follow-up appointment. Patient is accompanied by self.    DIAGNOSIS:  Status post gastric bypass surgery.  Obesity Grade II BMI 35-39.9     ANTHROPOMETRICS:  Initial Weight:  340 lb  Height:  6'2\"  Current Weight:  258.5 lb   BMI: 37.67  kg/(m^2).    VITAMINS AND MINERALS:  1 Multivitamin with Minerals (18 mg iron)- am  5000 International units Vitamin D  500 mcg Vitamin B-12 sublingual      NUTRITION HISTORY:  Breakfast: english muffin with peanut butter, 1% milk, 1-2 cup coffee  Lunch: meat/ cheese sandwich or 2 eggs, 1 slice toast or leftover pizza  Supper: 1/2 hamburger, potato chips, Diet soda  Snacks: 4 pm and after dinner-trail mix or crackers or crackers/ cheese or cookie (tries to avoid buying them) or ice cream  Fluids consumed:16 oz  Water, 30 oz Diet Soda (with or without caffeine), 8 oz  milk  Consuming liquid calories: No  Protein intake: 35-40 grams/day  Tolerate regular texture food: Yes  Any foods not tolerated details: Yes  If any food not tolerated: dry chicken or fish  Portion size: 1 cup or more  Take 20-30 minutes to consume each meal: No   Eat protein foods first: No  Fluids and meals separate by at least 30 minutes: No  Chew foods thoroughly: Yes  Tolerating diet: Yes  Drinking high protein supplements: No  Consuming snacks per day: 2  Additional Information: Pt lives alone and is recently retired. Pt shared that he has not been to the clinic for several years (not RD notes found in Epic). He has been attending OA and AA for many years. Buys food, but does not always take the time to prepare it. Emotional eating related to stress and pt tries to use diversions. Patient would mainly like to focus on getting back on trac with weight loss.     PHYSICAL " ACTIVITY:  Type: none (belongs to the [employer])      DIAGNOSIS:  Previous Nutrition Diagnosis: Altered gastrointestinal function related to alteration in gastrointestinal structure as evidenced by history of gastric bypass surgery.- no change    Previous goals:  No previous visits in EMR    Current Nutrition Diagnosis: Altered gastrointestinal function related to alteration in gastrointestinal structure as evidenced by history of gastric bypass surgery.    INTERVENTION:   Nutrition Prescription: Eat 3 meals a day at regular intervals. Consume 60-90 grams of protein daily. Follow post-surgical vitamin and mineral protocol.  Assessed learning needs and learning preferences.    GOALS:  Follow post-op vitamin/ mineral regimen (written information provided)  Exercise 2X per week starting with 15 minutes ([facility]  Reduce caffeine (coffee and pop) by 50% (not ready to quit it completely)  Limit snacking to 1X per day (try an approved protein drink-offered guidelines for products)  Avoid fluids 30 minutes before and 30 minutes after meals    Implementation: Discussed progress toward previous goals; reinforced importance of following bariatric lifestyle changes. Provided written information on Lifestyle Changes Before and After Weight -Loss Surgery    NUTRITION MONITORING AND EVALUATION:  Anticipated compliance: fair-good  Verbalized fair-good understanding.    Follow up:   2 months with Jacinda Montgomery PA-C  Patient to follow up in 12 months (pt to clarify insurance coverage-may want to return sooner)    TIME SPENT WITH PATIENT:  35 minutes    Shekhar Hickman RD, LD  [facility]  [phone]

## 2019-12-06 NOTE — PROGRESS NOTES
Return Bariatric Surgery Note    RE: Wesley Art  MR#: [id number]  : 1955  VISIT DATE: Dec 6, 2019     CHIEF COMPLAINT: Post-bariatric surgery follow-up and reestablish care    Dear Ailyn Gutierrez,    I had the pleasure of seeing your patient, Wesley Art, in my post-bariatric surgery assessment clinic.  Here is here today to reestablish care. Was last seen about 10 years ago.  Recently retired and is looking into getting back in shape. Wants to do something about weight gain. Energy level is low, not sure if pain is due to weight gain, feels like he needs to get moving and eat better.  Is not interested in revision or weight loss medication at this time.      HISTORY OF PRESENT ILLNESS:  Questions Regarding Prior Weight Loss Surgery Reviewed With Patient 2019   I had the following weight loss procedure: Alba-en-y Gastric Bypass   What year was your surgery? 2005   How has your weight changed since your last visit? I have gained weight   Are you currently taking any weight loss medications? No   Do you currently have any of the following: None of the above   Have you been to the Emergency room since your last visit with us? Yes   Were you in the hospital since your last visit with us? Yes   Do you have any concerns today? inability to deal with this on his own       Weight History:     2019   What is your highest lifetime weight? 338   What is your lowest weight since surgery? (In pounds) 210     Initial Weight: 340 lb (154.2 kg)  Current Weight: Weight: 285 lb 8 oz (129.5 kg)  Cumulative weight loss (lbs): 54.5     Patient is taking the following bariatric postoperative vitamins:  1 Complete multivitamins with minerals (at different times than calcium)  5000 Int Units of Vitamin D daily   No calcium   500 mcg of Vitamin B12 sl daily  B complex daily  1 Iron/Vit C. daily    Questions Regarding Co-Morbidities and Health Concerns Reviewed With Patient 2019   Pre-diabetes:  Gone away   Diabetes II: Gone away   High Blood Pressure: Gone Away   High cholesterol: Gone away   Heartburn/Reflux: Never   Sleep apnea: Never   PCOS: Never   Back pain: Gone away   Joint pain: Improved   Lower leg swelling: Never     Relevant PMH: Patient reports last year over Thanksgiving he had a bleeding ulcer.  Has been hospitalized for bleeding ulcer 3 times since surgery.  All three times due to NSAID use, when he was off of a PPI, or a combination of both.  Is currently taking omeprazole 20 mg daily.  D  Had follow up EGD and colonoscopy at same time in Jan 2018 that were WNL.     Eating Habits 12/6/2019   How many meals do you eat per day? 3   Do you snack between meals? Yes   How much food are you eating at each meal? 1/2 cup to 1 cup   Are you able to separate your meals and liquids by at least 30 minutes? No   Are you able to avoid liquid calories? Yes       Exercise Questions Reviewed With Patient 12/6/2019   How often do you exercise? Never   What keeps you from being more active?  Pain, I have just had surgery on one or more of my joints, Shortness of breath       Social History:      12/6/2019   Are you smoking? No   Are you drinking alcohol? No   Recently retired.  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  Continues to go to  and also attends OA.      Medications:  Current Outpatient Medications   Medication     Ascorbic Acid (VITAMIN C PO)     [place] 3 MG CAPS     buPROPion (WELLBUTRIN SR) 100 MG 12 hr tablet     Cholecalciferol (VITAMIN D) 2000 UNITS tablet     cyanocobalamin (VITAMIN B-12) 500 MCG SUBL sublingual tablet     doxazosin (CARDURA) 4 MG tablet     finasteride (PROSCAR) 5 MG tablet     HEMP OIL OR EXTRACT OR OTHER CBD CANNABINOID, NOT MEDICAL CANNABIS,     metaxalone (SKELAXIN) 800 MG tablet     Multiple Vitamins-Minerals (MULTI COMPLETE PO)     omeprazole (PRILOSEC) 40 MG DR capsule     oxybutynin (DITROPAN) 5 MG tablet     sertraline (ZOLOFT) 100 MG tablet     traMADol (ULTRAM) 50 MG  "tablet     No current facility-administered medications for this visit.          12/6/2019   Do you avoid NSAIDs such as (Ibuprofen, Aleve, Naproxen, Advil)?   No   Has regular pain but has tramadol and  flexeril to us prn for arthritic neck, back, wrist pain.      ROS:  GI:      12/6/2019   Vomiting: No   Diarrhea: Yes, due to current eating habits   Constipation: Yes, has BM almost every day. No current melena.      Swallowing trouble: No   Abdominal pain: No   Heartburn: No   Rash in skin folds: No   Stress urinary incontinence No     Skin:   BAR RBS ROS - SKIN 12/6/2019   Rash in skin folds: No     Psych:      12/6/2019   Depression: Yes   Anxiety: Yes       LABS/IMAGING/MEDICAL RECORDS REVIEW:   1/17/2019 EGD/Colonoscopy results  LABS:  Parathyroid Hormone Intact   Date Value Ref Range Status   02/17/2011 83 (H) 12 - 72 pg/mL Final     Vitamin B12   Date Value Ref Range Status   08/18/2016 1,590 (H) 211 - 946 pg/mL Final     Hemoglobin   Date Value Ref Range Status   07/18/2019 14.9 13.4 - 17.5 g/dl Final     Ferritin   Date Value Ref Range Status   11/20/2018 31 26 - 388 ng/mL Final     Iron   Date Value Ref Range Status   07/18/2019 121 38 - 169 ug/dL Final     Iron Binding Cap   Date Value Ref Range Status   07/18/2019 341 250 - 450 ug/dL Final     Iron Saturation Index   Date Value Ref Range Status   11/20/2018 34 15 - 46 % Final   02/17/2011 28 15 - 46 % Final       PHYSICAL EXAMINATION:  /84   Pulse 81   Ht 6' 1\" (1.854 m)   Wt 285 lb 8 oz (129.5 kg)   SpO2 96%   BMI 37.67 kg/m     GENERAL:  Good development and normal affect in no acute distress. Patient is alert and orientated x 3 and answers all questions appropriately.  HEENT: Head is normocephalic, nontraumatic. Pupils equal and round without conjunctival injection. Neck is supple without lymphadenopathy, thyroidmegaly, or mass. Trachea midline. Dentition WNL.   CARDIOVASCULAR:  Regular rate and rhythm without murmurs, rubs, or " gallops.  RESPIRATORY: Lungs are clear to auscultation bilaterally with good breath sounds.  GASTROINTESTINAL: Abdomen is obese, nondistended, soft, nontender, without organomegaly or masses.   MUSCULOSKELETAL:  Moves all 4 extremities equal and strong. Has a normal gait.   NEUROLOGIC: Cranial nerves II-XII grossly intact.  SKIN: No intertriginous irritation or rash.     ASSESSMENT AND PLAN:    1. Physical deconditioning/ Neck and Hip Pain  Referral to Get moving program.   - [facility], [facility] CHIROPRACTIC REFERRAL; Future    2. Bariatric surgery status  19 years s/p gastric bypass surgery    3. Postsurgical malabsorption  Continue taking recommended post-op vitamins. Start calcium citrate 600 mg twice daily.  Labs ordered per protocol.  - CBC with platelets; Future  - Vitamin B12; Future  - Vitamin D Screen; Future  - Parathyroid Hormone Intact; Future  - Iron and Iron Binding Capacity; Future  - Ferritin; Future    4. Weight Gain/ Class 2 obesity due to excess calories without serious comorbidity with body mass index (BMI) of 37.0 to 37.9 in adult   Body mass index is 37.67 kg/m .  Follow food plan per dietitian recommendations.    Follow up: Return to clinic in 2 months.     Sincerely,    Jacinda Montgomery PA-C    I spent a total of 35 minutes face to face with Wesley during today's office visit. Over 50% of this time was spent counseling the patient and/or coordinating care.

## 2019-12-06 NOTE — PATIENT INSTRUCTIONS
Start calcium citrate 600 mg twice daily.     Call to schedule get moving program.    Have follow up labs drawn.    Check in at the [facility] Lobby on the 1st floor if you would like to have your blood tests drawn today.  You may also get your blood drawn at any [facility] lab.  Lab results will be released through [facility] once resulted. Please allow 5 business days for me to review them and send you a personal message through [facility] regarding the results.   Return to clinic in 2 months.

## 2019-12-09 LAB — DEPRECATED CALCIDIOL+CALCIFEROL SERPL-MC: 30 UG/L (ref 20–75)

## 2019-12-12 ENCOUNTER — TELEPHONE (OUTPATIENT)
Dept: FAMILY MEDICINE | Facility: CLINIC | Age: 64
End: 2019-12-12

## 2019-12-12 DIAGNOSIS — G89.29 CHRONIC BILATERAL LOW BACK PAIN: Primary | ICD-10-CM

## 2019-12-12 DIAGNOSIS — M54.50 CHRONIC BILATERAL LOW BACK PAIN: Primary | ICD-10-CM

## 2019-12-12 NOTE — TELEPHONE ENCOUNTER
PA was denied.  Patient needs to have tried and failed Tizanidine HCL that is on formulary. Per Dr Gutierrez OK to change prescription to Tizanidine HCL 4 mg . Sent prescription to pharmacy.

## 2019-12-12 NOTE — TELEPHONE ENCOUNTER
Call from [facility] pharmacy requesting PA for Metaxalone.  Submitted through covermymeds.  Will wait for response.back.

## 2019-12-18 ENCOUNTER — APPOINTMENT (OUTPATIENT)
Dept: SURGERY | Facility: PHYSICIAN GROUP | Age: 64
End: 2019-12-18
Payer: COMMERCIAL

## 2019-12-18 ENCOUNTER — HOSPITAL ENCOUNTER (OUTPATIENT)
Facility: CLINIC | Age: 64
Discharge: HOME | End: 2019-12-18
Attending: SURGERY | Admitting: SURGERY
Payer: COMMERCIAL

## 2019-12-18 VITALS
DIASTOLIC BLOOD PRESSURE: 95 MMHG | BODY MASS INDEX: 37.77 KG/M2 | OXYGEN SATURATION: 95 % | HEIGHT: 73 IN | SYSTOLIC BLOOD PRESSURE: 135 MMHG | HEART RATE: 93 BPM | RESPIRATION RATE: 16 BRPM | WEIGHT: 285 LBS

## 2019-12-18 DIAGNOSIS — K91.2 MALNUTRITION FOLLOWING GASTROINTESTINAL SURGERY: ICD-10-CM

## 2019-12-18 DIAGNOSIS — Z98.84 BARIATRIC SURGERY STATUS: ICD-10-CM

## 2019-12-18 DIAGNOSIS — L72.3 SEBACEOUS CYST: ICD-10-CM

## 2019-12-18 DIAGNOSIS — K90.9 HYPERPARATHYROIDISM DUE TO INTESTINAL MALABSORPTION (H): Primary | ICD-10-CM

## 2019-12-18 DIAGNOSIS — E21.1 HYPERPARATHYROIDISM DUE TO INTESTINAL MALABSORPTION (H): Primary | ICD-10-CM

## 2019-12-18 PROCEDURE — 88304 TISSUE EXAM BY PATHOLOGIST: CPT | Performed by: SURGERY

## 2019-12-18 PROCEDURE — 12032 INTMD RPR S/A/T/EXT 2.6-7.5: CPT

## 2019-12-18 PROCEDURE — 88304 TISSUE EXAM BY PATHOLOGIST: CPT | Mod: 26 | Performed by: SURGERY

## 2019-12-18 PROCEDURE — 11403 EXC TR-EXT B9+MARG 2.1-3CM: CPT | Performed by: SURGERY

## 2019-12-18 PROCEDURE — 11402 EXC TR-EXT B9+MARG 1.1-2 CM: CPT | Performed by: SURGERY

## 2019-12-18 PROCEDURE — 11404 EXC TR-EXT B9+MARG 3.1-4 CM: CPT | Performed by: SURGERY

## 2019-12-18 RX ORDER — LIDOCAINE HYDROCHLORIDE 10 MG/ML
10 INJECTION, SOLUTION EPIDURAL; INFILTRATION; INTRACAUDAL; PERINEURAL ONCE
Status: DISCONTINUED | OUTPATIENT
Start: 2019-12-18 | End: 2019-12-18 | Stop reason: HOSPADM

## 2019-12-18 ASSESSMENT — MIFFLIN-ST. JEOR: SCORE: 2126.5

## 2019-12-18 NOTE — OP NOTE
Pre-Operative Diagnosis- Reccurrent back sebaceous cyst    Post-Operative Diagnosis- Same    Procedure-excision of 3 cm recurrent sebaceous cyst with layered closure.    Surgeon- Louis    Assistant- None    Anesthesia- 1% lidocaine with epi    Specimen-Cyst    Procedure  Consent was obtained. A surgical time out was performed. The patient was prepped and draped in the usual sterile fashion with Chloraprep. Using a sterile technique, 1 percent lidocaine with epinephrine was used to infiltrate the area. After adequate anesthesia was confirmed, a number 15 scalpel was used to make an elliptical incision overlying the lesion. Using sharp dissection a chronic 3 cm cyst with scar tissue was found and circumferentially freed.  It was removed intact. There was no bleeding and the wound bed was dry.   The skin was closed in multiple layers with a 3.0 and 4.0 Vicryl. Steri strips were applied. Site was dry with no visible bleeding. Patient tolerated the procedure well without complications. All sponge, instrument, and needle counts were correct at the conclusion of the case.      Ulises Myers M.D.  [facility]  [phone]    Please route or send letter to:  Primary Care Provider (PCP) and Referring Provider

## 2019-12-18 NOTE — PROGRESS NOTES
All labs look great except your PTH is slightly elevated.  This means you could be getting some of your calcium from your bones instead of you food.  Please increase you vitamin D to 10,000 Int Units daily.  Continue with your current supplements.  I will put labs in the [facility] system for you to recheck you PTH and Vit D level in 3 months.  Please have this done at any [facility] or clinic lab.

## 2019-12-18 NOTE — LETTER
[facility]  [address]           [address]           [place] [address]            [phone]    Wesley Art  [address] [place] [address]  December 20, 2019      Dear Wesley,    This letter is to inform you of the results of your pathology report from your recent procedure.   Back, excision:   -Benign epidermal inclusion cyst with fibroinflammatory and foreign body   giant cell reaction consistent with   rupture    This is a benign (non-cancerous) lesion that does not require any further treatment.       If you have any further questions, please do not hesitate to call: [facility] [phone].    Sincerely,  Ulises Myers M.D.  [facility]

## 2019-12-19 LAB — COPATH REPORT: NORMAL

## 2020-04-16 DIAGNOSIS — F41.1 GAD (GENERALIZED ANXIETY DISORDER): ICD-10-CM

## 2020-04-16 DIAGNOSIS — F33.41 RECURRENT MAJOR DEPRESSIVE DISORDER, IN PARTIAL REMISSION (H): ICD-10-CM

## 2020-04-16 RX ORDER — SERTRALINE HYDROCHLORIDE 100 MG/1
TABLET, FILM COATED ORAL
Qty: 90 TABLET | Refills: 3 | Status: SHIPPED | OUTPATIENT
Start: 2020-04-16

## 2021-01-15 ENCOUNTER — HEALTH MAINTENANCE LETTER (OUTPATIENT)
Age: 66
End: 2021-01-15

## 2021-09-04 ENCOUNTER — HEALTH MAINTENANCE LETTER (OUTPATIENT)
Age: 66
End: 2021-09-04

## 2022-10-16 ENCOUNTER — HEALTH MAINTENANCE LETTER (OUTPATIENT)
Age: 67
End: 2022-10-16

## 2023-06-01 ENCOUNTER — HEALTH MAINTENANCE LETTER (OUTPATIENT)
Age: 68
End: 2023-06-01

## 2025-03-01 ENCOUNTER — HOSPITAL ENCOUNTER (EMERGENCY)
Facility: CLINIC | Age: 70
Discharge: HOME | End: 2025-03-01
Attending: EMERGENCY MEDICINE | Admitting: EMERGENCY MEDICINE
Payer: COMMERCIAL

## 2025-03-01 ENCOUNTER — APPOINTMENT (OUTPATIENT)
Dept: CT IMAGING | Facility: CLINIC | Age: 70
End: 2025-03-01
Attending: EMERGENCY MEDICINE
Payer: COMMERCIAL

## 2025-03-01 VITALS
OXYGEN SATURATION: 98 % | HEIGHT: 73 IN | DIASTOLIC BLOOD PRESSURE: 83 MMHG | WEIGHT: 274.03 LBS | HEART RATE: 72 BPM | TEMPERATURE: 98.6 F | BODY MASS INDEX: 36.32 KG/M2 | RESPIRATION RATE: 18 BRPM | SYSTOLIC BLOOD PRESSURE: 134 MMHG

## 2025-03-01 DIAGNOSIS — R42 LIGHTHEADEDNESS: ICD-10-CM

## 2025-03-01 LAB
ANION GAP SERPL CALCULATED.3IONS-SCNC: 9 MMOL/L (ref 7–15)
BASOPHILS # BLD AUTO: 0.1 10E3/UL (ref 0–0.2)
BASOPHILS NFR BLD AUTO: 1 %
BUN SERPL-MCNC: 15 MG/DL (ref 8–23)
CALCIUM SERPL-MCNC: 9.5 MG/DL (ref 8.8–10.4)
CHLORIDE SERPL-SCNC: 102 MMOL/L (ref 98–107)
CREAT SERPL-MCNC: 0.99 MG/DL (ref 0.67–1.17)
EGFRCR SERPLBLD CKD-EPI 2021: 81 ML/MIN/1.73M2
EOSINOPHIL # BLD AUTO: 0.1 10E3/UL (ref 0–0.7)
EOSINOPHIL NFR BLD AUTO: 2 %
ERYTHROCYTE [DISTWIDTH] IN BLOOD BY AUTOMATED COUNT: 12.5 % (ref 10–15)
GLUCOSE BLDC GLUCOMTR-MCNC: 81 MG/DL (ref 70–99)
GLUCOSE SERPL-MCNC: 81 MG/DL (ref 70–99)
HCO3 SERPL-SCNC: 25 MMOL/L (ref 22–29)
HCT VFR BLD AUTO: 41.6 % (ref 40–53)
HGB BLD-MCNC: 13.8 G/DL (ref 13.3–17.7)
HOLD SPECIMEN: NORMAL
IMM GRANULOCYTES # BLD: 0 10E3/UL
IMM GRANULOCYTES NFR BLD: 0 %
LYMPHOCYTES # BLD AUTO: 2.1 10E3/UL (ref 0.8–5.3)
LYMPHOCYTES NFR BLD AUTO: 27 %
MCH RBC QN AUTO: 32.5 PG (ref 26.5–33)
MCHC RBC AUTO-ENTMCNC: 33.2 G/DL (ref 31.5–36.5)
MCV RBC AUTO: 98 FL (ref 78–100)
MONOCYTES # BLD AUTO: 0.7 10E3/UL (ref 0–1.3)
MONOCYTES NFR BLD AUTO: 9 %
NEUTROPHILS # BLD AUTO: 4.6 10E3/UL (ref 1.6–8.3)
NEUTROPHILS NFR BLD AUTO: 61 %
NRBC # BLD AUTO: 0 10E3/UL
NRBC BLD AUTO-RTO: 0 /100
PLATELET # BLD AUTO: 327 10E3/UL (ref 150–450)
POTASSIUM SERPL-SCNC: 4.3 MMOL/L (ref 3.4–5.3)
RBC # BLD AUTO: 4.25 10E6/UL (ref 4.4–5.9)
SODIUM SERPL-SCNC: 136 MMOL/L (ref 135–145)
TROPONIN T SERPL HS-MCNC: 10 NG/L
TROPONIN T SERPL HS-MCNC: 8 NG/L
WBC # BLD AUTO: 7.6 10E3/UL (ref 4–11)

## 2025-03-01 PROCEDURE — 84484 ASSAY OF TROPONIN QUANT: CPT | Performed by: EMERGENCY MEDICINE

## 2025-03-01 PROCEDURE — 82962 GLUCOSE BLOOD TEST: CPT

## 2025-03-01 PROCEDURE — 80048 BASIC METABOLIC PNL TOTAL CA: CPT | Performed by: EMERGENCY MEDICINE

## 2025-03-01 PROCEDURE — 93005 ELECTROCARDIOGRAM TRACING: CPT

## 2025-03-01 PROCEDURE — 82374 ASSAY BLOOD CARBON DIOXIDE: CPT | Performed by: EMERGENCY MEDICINE

## 2025-03-01 PROCEDURE — 85048 AUTOMATED LEUKOCYTE COUNT: CPT | Performed by: EMERGENCY MEDICINE

## 2025-03-01 PROCEDURE — 85004 AUTOMATED DIFF WBC COUNT: CPT | Performed by: EMERGENCY MEDICINE

## 2025-03-01 PROCEDURE — 250N000011 HC RX IP 250 OP 636: Performed by: EMERGENCY MEDICINE

## 2025-03-01 PROCEDURE — 36415 COLL VENOUS BLD VENIPUNCTURE: CPT | Performed by: EMERGENCY MEDICINE

## 2025-03-01 PROCEDURE — 99285 EMERGENCY DEPT VISIT HI MDM: CPT | Mod: 25

## 2025-03-01 PROCEDURE — 70450 CT HEAD/BRAIN W/O DYE: CPT

## 2025-03-01 PROCEDURE — 70496 CT ANGIOGRAPHY HEAD: CPT

## 2025-03-01 RX ORDER — IOPAMIDOL 755 MG/ML
500 INJECTION, SOLUTION INTRAVASCULAR ONCE
Status: COMPLETED | OUTPATIENT
Start: 2025-03-01 | End: 2025-03-01

## 2025-03-01 RX ADMIN — IOPAMIDOL 67 ML: 755 INJECTION, SOLUTION INTRAVENOUS at 09:43

## 2025-03-01 ASSESSMENT — COLUMBIA-SUICIDE SEVERITY RATING SCALE - C-SSRS
2. HAVE YOU ACTUALLY HAD ANY THOUGHTS OF KILLING YOURSELF IN THE PAST MONTH?: NO
1. IN THE PAST MONTH, HAVE YOU WISHED YOU WERE DEAD OR WISHED YOU COULD GO TO SLEEP AND NOT WAKE UP?: NO
6. HAVE YOU EVER DONE ANYTHING, STARTED TO DO ANYTHING, OR PREPARED TO DO ANYTHING TO END YOUR LIFE?: NO

## 2025-03-01 ASSESSMENT — ACTIVITIES OF DAILY LIVING (ADL)
ADLS_ACUITY_SCORE: 42

## 2025-03-01 NOTE — ED TRIAGE NOTES
"Yesterday afternoon, Pt was carrying groceries up the steps. He missed the top step, fell backwards and down 5 steps. Pt did not lose consciousness, denies any neck or back pain. Denies taking blood thinners.     Pt went to bed at 2200, woke up at 0500. Pt has endorsed dizziness since he woke up at 0500. Pt denies the room spinning, but states he can't walk a straight line. \" I do have a history of dizziness, but it's never been like this. I feel dizzy when I am sitting down.\"    Pt has no obvious neurological deficits when assessed. Pt is able to walk with a steady gait.       Denies SOB or chest pain.       "

## 2025-03-01 NOTE — DISCHARGE INSTRUCTIONS
Your blood work is reassuring and your scan does not show any signs of fracture, brain bleed or stroke.  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  It is unlikely that that is causing your symptoms, but it is possible.  We recommend that you follow-up with neurology or what is called neuro IR.  Please discuss this with your primary care provider and asked that they place a referral order.  Please return to the emergency department if you develop any new or concerning symptoms.

## 2025-03-01 NOTE — ED PROVIDER NOTES
Emergency Department Note      History of Present Illness     Chief Complaint   Dizziness      HPI   Wesley Art is a 69 year old male with a history of hypertension, hypercholesterolemia, and type 2 diabetes who presents for evaluation of dizziness. Nurse reports that patient fell down 5 steps while bringing in groceries yesterday. Denies any loss of consciousness, acute neck pain, or back pain. The rest of the day yesterday was normal. Patient went to bed around 10 pm last night, and woke up this morning around 5 am with dizziness. He denies room-spinning dizziness, but reports that he is dizzy when he is both sitting and standing. Right now, patient reports continued dizziness as he is siting in a chair. He has had dizziness and lightheadedness in the past but reports he has never had it while he is sitting down or this severe. He is able to ambulate but feels as though he is unable to walk in a straight line. He also endorses some lower back stiffness. Denies chest pain or shortness of breath. Denies use of blood thinners.     Independent Historian   Nurse as detailed above.    Review of External Notes   1/21/25 ED visit note. Patient seen for physical, reported some imbalance. He recently had an MRI of brain for forgetfulness and cognitive change.     Past Medical History   Medical History and Problem List   Hypertension  Hypercholesterolemia  Hypertriglyceridemia  Pre-diabetes  LU  MDD  Benign nodular prostatic hyperplasia with lower UTI symptoms  GIB  Melena  Primary osteoarthritis  Type 2 diabetes mellitus    Medications   Amlodipine  Atorvastatin  Gabapentin  Losartan  Metformin  Mirtazipine  Wegovy   Bupropion  Sertraline  Finasteride  Tizanidine    Surgical History   Appendectomy  Cholecystectomy  Gastric bypass  Cataract removal  Vasectomy   Cyst removal  [place] teeth extraction  Prostate surgery   Physical Exam     Patient Vitals for the past 24 hrs:   BP Temp Pulse Resp SpO2 Height Weight  "  03/01/25 1256 134/83 -- 72 -- 98 % -- --   03/01/25 0845 -- -- -- -- -- 1.854 m (6' 1\") --   03/01/25 0838 (!) 150/94 98.6  F (37  C) 78 18 98 % -- --   03/01/25 0837 -- -- -- -- -- -- 124.3 kg (274 lb 0.5 oz)     Physical Exam  General: No acute distress  Head: No obvious trauma to head.  Ears, Nose, Throat:  External ears normal.  Nose normal.  No pharyngeal erythema, swelling or exudate.  Midline uvula. Moist mucus membranes.  Eyes:  Conjunctivae clear. EOMI PERRL. No nystagmus.  Neck: Normal range of motion.  Neck supple.   CV: Regular rate and rhythm.  No murmurs.      Respiratory: Effort normal and breath sounds normal.  No wheezing or crackles.   Gastrointestinal: Soft.  No distension. There is no tenderness.  There is no rigidity, no rebound and no guarding.   Musculoskeletal: Normal range of motion.  Non tender extremities to palpations. No lower extremity edema  Neuro: Alert. Moving all extremities appropriately.  Normal speech.  CN II-XII grossly intact, no pronator drift, normal finger-nose-finger, visual fields intact.  Gross muscle strength intact of the proximal and distal bilateral upper and lower extremities.  Sensation intact to light touch in all 4 extremities.    Normal gait.     Skin: Skin is warm and dry.  No rash noted.   Psych: Normal mood and affect. Behavior is normal.    Diagnostics     Lab Results   Labs Ordered and Resulted from Time of ED Arrival to Time of ED Departure   CBC WITH PLATELETS AND DIFFERENTIAL - Abnormal       Result Value    WBC Count 7.6      RBC Count 4.25 (*)     Hemoglobin 13.8      Hematocrit 41.6      MCV 98      MCH 32.5      MCHC 33.2      RDW 12.5      Platelet Count 327      % Neutrophils 61      % Lymphocytes 27      % Monocytes 9      % Eosinophils 2      % Basophils 1      % Immature Granulocytes 0      NRBCs per 100 WBC 0      Absolute Neutrophils 4.6      Absolute Lymphocytes 2.1      Absolute Monocytes 0.7      Absolute Eosinophils 0.1      Absolute " Basophils 0.1      Absolute Immature Granulocytes 0.0      Absolute NRBCs 0.0     BASIC METABOLIC PANEL - Normal    Sodium 136      Potassium 4.3      Chloride 102      Carbon Dioxide (CO2) 25      Anion Gap 9      Urea Nitrogen 15.0      Creatinine 0.99      GFR Estimate 81      Calcium 9.5      Glucose 81     TROPONIN T, HIGH SENSITIVITY - Normal    Troponin T, High Sensitivity 10     GLUCOSE BY METER - Normal    GLUCOSE BY METER POCT 81     TROPONIN T, HIGH SENSITIVITY       Imaging   Head CT w/o contrast   Final Result   IMPRESSION:   1.  No acute findings.      CTA Head Neck with Contrast    (Results Pending)       EKG   ECG results from 03/01/25   EKG 12 lead     Value    Systolic Blood Pressure     Diastolic Blood Pressure     Ventricular Rate 71    Atrial Rate 71    LA Interval 132    QRS Duration 80        QTc 408    P Axis 33    R AXIS -13    T Axis 22    Interpretation ECG      Sinus rhythm  Minimal voltage criteria for LVH, may be normal variant ( R in aVL )  No changes compared to 06/28/2009  Read by Dr Wade [id number]          Independent Interpretation   CT Head: No intracranial hemorrhage or midline shift.    ED Course      Medications Administered   Medications   iopamidol (ISOVUE-370) solution 500 mL (67 mLs Intravenous $Given 3/1/25 0943)   sodium chloride (PF) 0.9% PF flush 100 mL (80 mLs Intravenous $Given 3/1/25 0943)       Procedures   Procedures     Discussion of Management   None    ED Course   ED Course as of 03/01/25 1311   Sat Mar 01, 2025   0843 I initially assessed the patient and obtained the history and physical exam.        Additional Documentation  None    Medical Decision Making / Diagnosis     CMS Diagnoses: None    MIPS       None    [facility]   Wesley Art is a 69 year old male presenting with dizziness.  Upon further discussion with the patient he denies any vertigo and it appears his symptoms are more lightheadedness than dizziness.  He states he has been having  "longstanding issues with feeling lightheaded upon standing,.  He is currently having lightheadedness and unsteadiness while sitting which is new.  He is able to ambulate without any difficulty and has a steady gait.  Neuroexam is normal.  We does not meet any criteria for a code stroke, but imaging is obtained.  Head CT is negative for acute intracranial hemorrhage.  CT angio of the head and neck is completed but there is a long delay in receiving the formal radiology read.  Radiology fax us their read as it was not coming through electronically. 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 No large vessel occlusion findings identified.\"  I spoke to radiology and they recommend neurology/neuro IR follow-up not emergently. The patient does not normally doctor through [facility] and would like to keep it within his regular health system.  This seems reasonable.  He feels comfortable discharging home and he is able to ambulate independently.  I instructed him to contact his primary care provider and ask for referral.  He reports he will do this.  Strict return precautions are given and he verbalizes understanding.  He is discharged home in stable condition.        Disposition   The patient was discharged.     Diagnosis     ICD-10-CM    1. Lightheadedness  R42            Discharge Medications   New Prescriptions    No medications on file         Scribe Disclosure:  I, Paigedelia Salvador, am serving as a scribe at 8:43 AM on 3/1/2025 to document services personally performed by Paramjit Wade MD based on my observations and the provider's statements to me.        Paramjit Wade MD  03/02/25 1017    "

## 2025-03-03 LAB
ATRIAL RATE - MUSE: 71 BPM
DIASTOLIC BLOOD PRESSURE - MUSE: NORMAL MMHG
INTERPRETATION ECG - MUSE: NORMAL
P AXIS - MUSE: 33 DEGREES
PR INTERVAL - MUSE: 132 MS
QRS DURATION - MUSE: 80 MS
QT - MUSE: 376 MS
QTC - MUSE: 408 MS
R AXIS - MUSE: -13 DEGREES
SYSTOLIC BLOOD PRESSURE - MUSE: NORMAL MMHG
T AXIS - MUSE: 22 DEGREES
VENTRICULAR RATE- MUSE: 71 BPM

## 2025-03-22 ENCOUNTER — HEALTH MAINTENANCE LETTER (OUTPATIENT)
Age: 70
End: 2025-03-22

## (undated) DEVICE — CLIP ENDO RESOLUTION 2.8MMX235CM M00522610

## (undated) DEVICE — KIT ENDO TURNOVER/PROCEDURE W/CLEAN A SCOPE LINERS 103888

## (undated) DEVICE — CLIP HEMOCLIP ENDOSCOPIC INSTINCT 2.8X230CM INSC-7-230-SS

## (undated) DEVICE — NDL SCLEROTHERAPY 25GA CARR-LOCK  00711811

## (undated) RX ORDER — FENTANYL CITRATE 50 UG/ML
INJECTION, SOLUTION INTRAMUSCULAR; INTRAVENOUS
Status: DISPENSED
Start: 2018-11-20